# Patient Record
Sex: MALE | Race: ASIAN | NOT HISPANIC OR LATINO | Employment: OTHER | URBAN - METROPOLITAN AREA
[De-identification: names, ages, dates, MRNs, and addresses within clinical notes are randomized per-mention and may not be internally consistent; named-entity substitution may affect disease eponyms.]

---

## 2023-03-21 DIAGNOSIS — I10 HYPERTENSION, UNSPECIFIED TYPE: Primary | ICD-10-CM

## 2023-03-21 DIAGNOSIS — E78.2 MIXED HYPERLIPIDEMIA: Primary | ICD-10-CM

## 2023-03-22 RX ORDER — LOSARTAN POTASSIUM 100 MG/1
TABLET ORAL
Qty: 90 TABLET | Refills: 0 | Status: SHIPPED | OUTPATIENT
Start: 2023-03-22 | End: 2023-07-18 | Stop reason: SDUPTHER

## 2023-03-22 RX ORDER — EZETIMIBE 10 MG/1
TABLET ORAL
Qty: 30 TABLET | Refills: 0 | Status: SHIPPED | OUTPATIENT
Start: 2023-03-22 | End: 2023-12-12 | Stop reason: HOSPADM

## 2023-03-23 DIAGNOSIS — E78.2 MIXED HYPERLIPIDEMIA: Primary | ICD-10-CM

## 2023-03-23 RX ORDER — SIMVASTATIN 40 MG/1
TABLET, FILM COATED ORAL
Qty: 30 TABLET | Refills: 0 | Status: SHIPPED | OUTPATIENT
Start: 2023-03-23 | End: 2023-04-28

## 2023-04-27 DIAGNOSIS — E78.2 MIXED HYPERLIPIDEMIA: ICD-10-CM

## 2023-04-28 RX ORDER — SIMVASTATIN 40 MG/1
TABLET, FILM COATED ORAL
Qty: 30 TABLET | Refills: 0 | Status: SHIPPED | OUTPATIENT
Start: 2023-04-28 | End: 2023-05-22

## 2023-05-19 DIAGNOSIS — E78.2 MIXED HYPERLIPIDEMIA: ICD-10-CM

## 2023-05-22 RX ORDER — SIMVASTATIN 40 MG/1
TABLET, FILM COATED ORAL
Qty: 30 TABLET | Refills: 2 | Status: SHIPPED | OUTPATIENT
Start: 2023-05-22 | End: 2023-09-01

## 2023-05-23 DIAGNOSIS — K21.9 GASTROESOPHAGEAL REFLUX DISEASE WITHOUT ESOPHAGITIS: Primary | ICD-10-CM

## 2023-05-23 RX ORDER — PANTOPRAZOLE SODIUM 40 MG/1
TABLET, DELAYED RELEASE ORAL
Qty: 30 TABLET | Refills: 0 | Status: SHIPPED | OUTPATIENT
Start: 2023-05-23 | End: 2023-07-12 | Stop reason: SDUPTHER

## 2023-07-12 ENCOUNTER — OFFICE VISIT (OUTPATIENT)
Dept: PRIMARY CARE | Facility: CLINIC | Age: 88
End: 2023-07-12
Payer: COMMERCIAL

## 2023-07-12 VITALS
SYSTOLIC BLOOD PRESSURE: 116 MMHG | WEIGHT: 158 LBS | HEART RATE: 86 BPM | TEMPERATURE: 98.2 F | DIASTOLIC BLOOD PRESSURE: 76 MMHG | RESPIRATION RATE: 16 BRPM | OXYGEN SATURATION: 100 % | BODY MASS INDEX: 26.29 KG/M2

## 2023-07-12 DIAGNOSIS — M25.521 ELBOW PAIN, CHRONIC, RIGHT: Primary | ICD-10-CM

## 2023-07-12 DIAGNOSIS — N18.31 STAGE 3A CHRONIC KIDNEY DISEASE (MULTI): ICD-10-CM

## 2023-07-12 DIAGNOSIS — K21.9 GASTROESOPHAGEAL REFLUX DISEASE WITHOUT ESOPHAGITIS: ICD-10-CM

## 2023-07-12 DIAGNOSIS — G89.29 ELBOW PAIN, CHRONIC, RIGHT: Primary | ICD-10-CM

## 2023-07-12 DIAGNOSIS — I48.20 CHRONIC A-FIB (MULTI): ICD-10-CM

## 2023-07-12 PROCEDURE — 99213 OFFICE O/P EST LOW 20 MIN: CPT | Performed by: FAMILY MEDICINE

## 2023-07-12 PROCEDURE — 1126F AMNT PAIN NOTED NONE PRSNT: CPT | Performed by: FAMILY MEDICINE

## 2023-07-12 RX ORDER — FUROSEMIDE 20 MG/1
20 TABLET ORAL DAILY PRN
COMMUNITY
End: 2023-11-06

## 2023-07-12 RX ORDER — PANTOPRAZOLE SODIUM 40 MG/1
40 TABLET, DELAYED RELEASE ORAL DAILY
Qty: 30 TABLET | Refills: 2 | Status: SHIPPED | OUTPATIENT
Start: 2023-07-12 | End: 2023-10-05

## 2023-07-12 RX ORDER — AMLODIPINE BESYLATE 2.5 MG/1
2.5 TABLET ORAL DAILY
COMMUNITY
Start: 2023-06-10 | End: 2023-11-06

## 2023-07-12 RX ORDER — GABAPENTIN 300 MG/1
300 CAPSULE ORAL 3 TIMES DAILY
COMMUNITY
End: 2023-12-09 | Stop reason: ENTERED-IN-ERROR

## 2023-07-12 RX ORDER — RIVAROXABAN 15 MG/1
15 TABLET, FILM COATED ORAL DAILY
COMMUNITY
End: 2023-11-20

## 2023-07-12 ASSESSMENT — ENCOUNTER SYMPTOMS
RESPIRATORY NEGATIVE: 1
DEPRESSION: 1
JOINT SWELLING: 1
CARDIOVASCULAR NEGATIVE: 1
OCCASIONAL FEELINGS OF UNSTEADINESS: 1
APPETITE CHANGE: 1
LOSS OF SENSATION IN FEET: 0
AGITATION: 0
ARTHRALGIAS: 1

## 2023-07-12 NOTE — PROGRESS NOTES
Subjective   Patient ID: Aristeo Rome is a 89 y.o. male who presents for Med Refill.    Patient presented with chief complaint of right elbow pain.   this has been going on for some time now pain flexion and complete extension.  He had a history of this for some time.  Voltaren cream did not help.  Other medicines did not help.  He has no numbness tingling.  Feels weaker at times.  Patient has no chest pain or shortness of breath no nausea vomiting diarrhea.  His appetite is a little decreased.  He has been taking Ensure that helps.  He needs a refill  Patient also needs a refill on his PPI.         Review of Systems   Constitutional:  Positive for appetite change.   Respiratory: Negative.     Cardiovascular: Negative.    Musculoskeletal:  Positive for arthralgias and joint swelling.   Psychiatric/Behavioral:  Negative for agitation.        Objective   /76 (BP Location: Right arm, Patient Position: Sitting, BP Cuff Size: Adult)   Pulse 86   Temp 36.8 °C (98.2 °F)   Resp 16   Wt 71.7 kg (158 lb)   SpO2 100%   BMI 26.29 kg/m²     Physical Exam  Constitutional:       Appearance: Normal appearance.   Cardiovascular:      Rate and Rhythm: Normal rate and regular rhythm.      Pulses: Normal pulses.   Pulmonary:      Effort: Pulmonary effort is normal.      Breath sounds: Normal breath sounds.   Musculoskeletal:      Comments: Patient's right elbow has limited range of motion.  Unable to flex the elbow completely.  Slight swelling tenderness.  Neurovascular intact, strength appears intact but limited due to pain   Neurological:      Mental Status: He is alert.         Assessment/Plan   Problem List Items Addressed This Visit       Chronic a-fib (CMS/HCC)    Relevant Medications    amLODIPine (Norvasc) 2.5 mg tablet    Stage 3a chronic kidney disease     Other Visit Diagnoses       Elbow pain, chronic, right    -  Primary    Gastroesophageal reflux disease without esophagitis        Relevant Medications     pantoprazole (ProtoNix) 40 mg EC tablet          Patient is a scheduled for physical in 3 months he can follow-up tomorrow for possible injection in his right elbow.  May need to see orthopedics  Have any chest pain shortness of breath any nausea vomiting or fever headache Or ER  Patient  Daughter translate for him.  Declined

## 2023-07-13 ENCOUNTER — OFFICE VISIT (OUTPATIENT)
Dept: PRIMARY CARE | Facility: CLINIC | Age: 88
End: 2023-07-13
Payer: COMMERCIAL

## 2023-07-13 VITALS
TEMPERATURE: 97.5 F | BODY MASS INDEX: 26.33 KG/M2 | DIASTOLIC BLOOD PRESSURE: 58 MMHG | RESPIRATION RATE: 16 BRPM | HEART RATE: 75 BPM | OXYGEN SATURATION: 96 % | WEIGHT: 158.25 LBS | SYSTOLIC BLOOD PRESSURE: 94 MMHG

## 2023-07-13 DIAGNOSIS — M25.521 ELBOW PAIN, CHRONIC, RIGHT: Primary | ICD-10-CM

## 2023-07-13 DIAGNOSIS — G89.29 ELBOW PAIN, CHRONIC, RIGHT: Primary | ICD-10-CM

## 2023-07-13 PROCEDURE — 1126F AMNT PAIN NOTED NONE PRSNT: CPT | Performed by: STUDENT IN AN ORGANIZED HEALTH CARE EDUCATION/TRAINING PROGRAM

## 2023-07-13 PROCEDURE — 76942 ECHO GUIDE FOR BIOPSY: CPT

## 2023-07-13 PROCEDURE — 20551 NJX 1 TENDON ORIGIN/INSJ: CPT

## 2023-07-13 PROCEDURE — 1159F MED LIST DOCD IN RCRD: CPT | Performed by: STUDENT IN AN ORGANIZED HEALTH CARE EDUCATION/TRAINING PROGRAM

## 2023-07-13 PROCEDURE — 99213 OFFICE O/P EST LOW 20 MIN: CPT | Performed by: STUDENT IN AN ORGANIZED HEALTH CARE EDUCATION/TRAINING PROGRAM

## 2023-07-13 PROCEDURE — 1160F RVW MEDS BY RX/DR IN RCRD: CPT | Performed by: STUDENT IN AN ORGANIZED HEALTH CARE EDUCATION/TRAINING PROGRAM

## 2023-07-13 PROCEDURE — 1036F TOBACCO NON-USER: CPT | Performed by: STUDENT IN AN ORGANIZED HEALTH CARE EDUCATION/TRAINING PROGRAM

## 2023-07-13 PROCEDURE — 96372 THER/PROPH/DIAG INJ SC/IM: CPT | Performed by: STUDENT IN AN ORGANIZED HEALTH CARE EDUCATION/TRAINING PROGRAM

## 2023-07-13 RX ORDER — LIDOCAINE HYDROCHLORIDE 10 MG/ML
0.5 INJECTION INFILTRATION; PERINEURAL
Status: COMPLETED | OUTPATIENT
Start: 2023-07-13 | End: 2023-07-13

## 2023-07-13 RX ORDER — TRIAMCINOLONE ACETONIDE 40 MG/ML
20 INJECTION, SUSPENSION INTRA-ARTICULAR; INTRAMUSCULAR
Status: COMPLETED | OUTPATIENT
Start: 2023-07-13 | End: 2023-07-13

## 2023-07-13 RX ADMIN — TRIAMCINOLONE ACETONIDE 20 MG: 40 INJECTION, SUSPENSION INTRA-ARTICULAR; INTRAMUSCULAR at 15:09

## 2023-07-13 RX ADMIN — LIDOCAINE HYDROCHLORIDE 0.5 ML: 10 INJECTION INFILTRATION; PERINEURAL at 15:09

## 2023-07-13 ASSESSMENT — ENCOUNTER SYMPTOMS: ARTHRALGIAS: 1

## 2023-07-13 NOTE — PROGRESS NOTES
Subjective   Aristeo Rome is a 89 y.o. male who presents for Arm Pain (Pt here today for steroid injection to right arm).  HPI  Aristeo is here with daughter for joint injection of the elbow.   He has had pain in right elbow for the past > 6 months. He is having shooting pain starting in right elbow and shooting down to right wrist.  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  Some weakness with picking up spoon.  No PT for the arm.  No EMG in the past.    No fever or chills.    Review of Systems   Musculoskeletal:  Positive for arthralgias.     Objective     BP 94/58 (BP Location: Right arm, Patient Position: Sitting)   Pulse 75   Temp 36.4 °C (97.5 °F) (Temporal)   Resp 16   Wt 71.8 kg (158 lb 4 oz)   SpO2 96%   BMI 26.33 kg/m²   Physical Exam  Musculoskeletal:         General: No swelling or deformity.      Comments: RUE: Most TTP to lateral epicondyle, lesser so to medial epicondyle and bicep insertion. Pain  moreso with extension of middle finger with resistance, lesser so with flexion. Pain with resistance in arm flexion. Strength mildly diminished distally. NV intact.        Injection Upper Extremity: R elbow for lateral epicondylitis on 7/13/2023 3:09 PM  Indications: pain  Details: 25 G needle, ultrasound-guided lateral approach  Medications: 0.5 mL lidocaine 10 mg/mL (1 %); 20 mg triamcinolone acetonide 40 mg/mL  Outcome: tolerated well, no immediate complications  Consent was given by the patient. Patient was prepped and draped in the usual sterile fashion.        Assessment/Plan   Problem List Items Addressed This Visit    None  Visit Diagnoses       Elbow pain, chronic, right    -  Primary    Relevant Orders    Injection Upper Extremity: R elbow            Patient seen today for corticosteroid injection, patient has diffuse pain over the elbow but with actions, we did did a lateral epicondyle injection as above.  Patient tolerated well without complication.    Continue follow-up  with PCP if symptoms persist or worsen.

## 2023-07-14 NOTE — RESULT ENCOUNTER NOTE
Can we let the patient and his daughter know his x-ray showed advanced arthritic changes in his elbow with some swelling

## 2023-07-18 DIAGNOSIS — I10 HYPERTENSION, UNSPECIFIED TYPE: ICD-10-CM

## 2023-07-18 RX ORDER — LOSARTAN POTASSIUM 100 MG/1
100 TABLET ORAL DAILY
Qty: 90 TABLET | Refills: 1 | Status: SHIPPED | OUTPATIENT
Start: 2023-07-18 | End: 2024-01-15

## 2023-07-19 ENCOUNTER — TELEPHONE (OUTPATIENT)
Dept: PRIMARY CARE | Facility: CLINIC | Age: 88
End: 2023-07-19
Payer: COMMERCIAL

## 2023-07-31 ENCOUNTER — TELEPHONE (OUTPATIENT)
Dept: PRIMARY CARE | Facility: CLINIC | Age: 88
End: 2023-07-31
Payer: COMMERCIAL

## 2023-07-31 DIAGNOSIS — N18.31 STAGE 3A CHRONIC KIDNEY DISEASE (MULTI): Primary | ICD-10-CM

## 2023-08-01 RX ORDER — PEDI NUTRITION,IRON,LACT-FREE 0.06 G-1.5
414 LIQUID (ML) ORAL DAILY
Qty: 5796 ML | Refills: 11 | Status: SHIPPED | OUTPATIENT
Start: 2023-08-01 | End: 2024-03-25

## 2023-09-01 DIAGNOSIS — E78.2 MIXED HYPERLIPIDEMIA: ICD-10-CM

## 2023-09-01 RX ORDER — SIMVASTATIN 40 MG/1
40 TABLET, FILM COATED ORAL DAILY
Qty: 30 TABLET | Refills: 0 | Status: SHIPPED | OUTPATIENT
Start: 2023-09-01 | End: 2023-10-06

## 2023-10-05 DIAGNOSIS — K21.9 GASTROESOPHAGEAL REFLUX DISEASE WITHOUT ESOPHAGITIS: ICD-10-CM

## 2023-10-05 RX ORDER — PANTOPRAZOLE SODIUM 40 MG/1
40 TABLET, DELAYED RELEASE ORAL DAILY
Qty: 30 TABLET | Refills: 0 | Status: SHIPPED | OUTPATIENT
Start: 2023-10-05 | End: 2023-11-13

## 2023-10-06 DIAGNOSIS — E78.2 MIXED HYPERLIPIDEMIA: ICD-10-CM

## 2023-10-06 RX ORDER — SIMVASTATIN 40 MG/1
40 TABLET, FILM COATED ORAL DAILY
Qty: 30 TABLET | Refills: 0 | Status: SHIPPED | OUTPATIENT
Start: 2023-10-06 | End: 2023-11-13

## 2023-11-06 DIAGNOSIS — I10 HYPERTENSION, UNSPECIFIED TYPE: Primary | ICD-10-CM

## 2023-11-06 DIAGNOSIS — I48.20 CHRONIC A-FIB (MULTI): ICD-10-CM

## 2023-11-06 RX ORDER — FUROSEMIDE 20 MG/1
20 TABLET ORAL DAILY PRN
Qty: 30 TABLET | Refills: 11 | Status: SHIPPED | OUTPATIENT
Start: 2023-11-06

## 2023-11-06 RX ORDER — AMLODIPINE BESYLATE 2.5 MG/1
2.5 TABLET ORAL DAILY
Qty: 30 TABLET | Refills: 11 | Status: SHIPPED | OUTPATIENT
Start: 2023-11-06

## 2023-11-10 DIAGNOSIS — E78.2 MIXED HYPERLIPIDEMIA: ICD-10-CM

## 2023-11-10 DIAGNOSIS — K21.9 GASTROESOPHAGEAL REFLUX DISEASE WITHOUT ESOPHAGITIS: ICD-10-CM

## 2023-11-13 ENCOUNTER — OFFICE VISIT (OUTPATIENT)
Dept: PRIMARY CARE | Facility: CLINIC | Age: 88
End: 2023-11-13
Payer: COMMERCIAL

## 2023-11-13 VITALS
TEMPERATURE: 98 F | WEIGHT: 160.38 LBS | SYSTOLIC BLOOD PRESSURE: 135 MMHG | BODY MASS INDEX: 26.69 KG/M2 | OXYGEN SATURATION: 98 % | RESPIRATION RATE: 16 BRPM | DIASTOLIC BLOOD PRESSURE: 70 MMHG | HEART RATE: 77 BPM

## 2023-11-13 DIAGNOSIS — E78.5 DYSLIPIDEMIA: ICD-10-CM

## 2023-11-13 DIAGNOSIS — Z23 NEED FOR VACCINATION: ICD-10-CM

## 2023-11-13 DIAGNOSIS — M25.521 ELBOW PAIN, CHRONIC, RIGHT: Primary | ICD-10-CM

## 2023-11-13 DIAGNOSIS — I48.20 CHRONIC A-FIB (MULTI): ICD-10-CM

## 2023-11-13 DIAGNOSIS — G89.29 ELBOW PAIN, CHRONIC, RIGHT: Primary | ICD-10-CM

## 2023-11-13 DIAGNOSIS — R60.0 BILATERAL EDEMA OF LOWER EXTREMITY: ICD-10-CM

## 2023-11-13 DIAGNOSIS — G62.9 NEUROPATHY: ICD-10-CM

## 2023-11-13 DIAGNOSIS — N18.31 STAGE 3A CHRONIC KIDNEY DISEASE (MULTI): ICD-10-CM

## 2023-11-13 DIAGNOSIS — Z00.00 MEDICARE ANNUAL WELLNESS VISIT, SUBSEQUENT: ICD-10-CM

## 2023-11-13 DIAGNOSIS — I10 HYPERTENSION, UNSPECIFIED TYPE: ICD-10-CM

## 2023-11-13 PROBLEM — N10 PYELONEPHRITIS, ACUTE: Status: RESOLVED | Noted: 2023-11-13 | Resolved: 2023-11-13

## 2023-11-13 PROBLEM — I25.10 CAD (CORONARY ARTERY DISEASE): Status: ACTIVE | Noted: 2023-11-13

## 2023-11-13 PROBLEM — N13.8 BPH WITH OBSTRUCTION/LOWER URINARY TRACT SYMPTOMS: Status: ACTIVE | Noted: 2023-11-13

## 2023-11-13 PROBLEM — N40.1 BPH WITH OBSTRUCTION/LOWER URINARY TRACT SYMPTOMS: Status: ACTIVE | Noted: 2023-11-13

## 2023-11-13 PROBLEM — K21.9 GASTROESOPHAGEAL REFLUX DISEASE: Status: ACTIVE | Noted: 2023-11-13

## 2023-11-13 PROBLEM — J20.9 ACUTE BRONCHITIS: Status: RESOLVED | Noted: 2023-11-13 | Resolved: 2023-11-13

## 2023-11-13 PROBLEM — R79.89 ELEVATED LFTS: Status: ACTIVE | Noted: 2023-11-13

## 2023-11-13 PROBLEM — L30.9 DERMATITIS: Status: RESOLVED | Noted: 2023-11-13 | Resolved: 2023-11-13

## 2023-11-13 PROBLEM — L21.9 SEBORRHEIC DERMATITIS: Status: RESOLVED | Noted: 2023-11-13 | Resolved: 2023-11-13

## 2023-11-13 PROBLEM — J30.2 ALLERGIC RHINITIS, SEASONAL: Status: ACTIVE | Noted: 2023-11-13

## 2023-11-13 PROBLEM — R05.9 COUGH: Status: RESOLVED | Noted: 2023-11-13 | Resolved: 2023-11-13

## 2023-11-13 PROCEDURE — G0008 ADMIN INFLUENZA VIRUS VAC: HCPCS

## 2023-11-13 PROCEDURE — G0439 PPPS, SUBSEQ VISIT: HCPCS

## 2023-11-13 PROCEDURE — 90662 IIV NO PRSV INCREASED AG IM: CPT

## 2023-11-13 PROCEDURE — 1126F AMNT PAIN NOTED NONE PRSNT: CPT

## 2023-11-13 PROCEDURE — 1036F TOBACCO NON-USER: CPT

## 2023-11-13 PROCEDURE — 1160F RVW MEDS BY RX/DR IN RCRD: CPT

## 2023-11-13 PROCEDURE — 1159F MED LIST DOCD IN RCRD: CPT

## 2023-11-13 PROCEDURE — 3078F DIAST BP <80 MM HG: CPT

## 2023-11-13 PROCEDURE — 3075F SYST BP GE 130 - 139MM HG: CPT

## 2023-11-13 RX ORDER — SIMVASTATIN 40 MG/1
40 TABLET, FILM COATED ORAL DAILY
Qty: 30 TABLET | Refills: 0 | Status: SHIPPED | OUTPATIENT
Start: 2023-11-13 | End: 2024-01-08

## 2023-11-13 RX ORDER — LIDOCAINE 50 MG/G
1 PATCH TOPICAL DAILY
Qty: 30 PATCH | Refills: 11 | Status: SHIPPED | OUTPATIENT
Start: 2023-11-13 | End: 2023-12-12 | Stop reason: HOSPADM

## 2023-11-13 RX ORDER — PANTOPRAZOLE SODIUM 40 MG/1
40 TABLET, DELAYED RELEASE ORAL DAILY
Qty: 30 TABLET | Refills: 0 | Status: SHIPPED | OUTPATIENT
Start: 2023-11-13 | End: 2024-02-07

## 2023-11-13 RX ORDER — DICLOFENAC SODIUM 10 MG/G
4 GEL TOPICAL 4 TIMES DAILY
Qty: 480 G | Refills: 1 | Status: SHIPPED | OUTPATIENT
Start: 2023-11-13 | End: 2023-12-12 | Stop reason: HOSPADM

## 2023-11-13 ASSESSMENT — ENCOUNTER SYMPTOMS
DEPRESSION: 1
SHORTNESS OF BREATH: 0
WEAKNESS: 0
COLOR CHANGE: 0
NECK PAIN: 0
CHILLS: 0
OCCASIONAL FEELINGS OF UNSTEADINESS: 1
BACK PAIN: 0
ARTHRALGIAS: 1
JOINT SWELLING: 1
NUMBNESS: 0
NECK STIFFNESS: 0
WOUND: 0
FATIGUE: 0
FEVER: 0
LOSS OF SENSATION IN FEET: 1

## 2023-11-13 ASSESSMENT — COLUMBIA-SUICIDE SEVERITY RATING SCALE - C-SSRS
6. HAVE YOU EVER DONE ANYTHING, STARTED TO DO ANYTHING, OR PREPARED TO DO ANYTHING TO END YOUR LIFE?: NO
1. IN THE PAST MONTH, HAVE YOU WISHED YOU WERE DEAD OR WISHED YOU COULD GO TO SLEEP AND NOT WAKE UP?: NO
2. HAVE YOU ACTUALLY HAD ANY THOUGHTS OF KILLING YOURSELF?: NO

## 2023-11-13 ASSESSMENT — PATIENT HEALTH QUESTIONNAIRE - PHQ9
1. LITTLE INTEREST OR PLEASURE IN DOING THINGS: SEVERAL DAYS
10. IF YOU CHECKED OFF ANY PROBLEMS, HOW DIFFICULT HAVE THESE PROBLEMS MADE IT FOR YOU TO DO YOUR WORK, TAKE CARE OF THINGS AT HOME, OR GET ALONG WITH OTHER PEOPLE: VERY DIFFICULT
SUM OF ALL RESPONSES TO PHQ9 QUESTIONS 1 AND 2: 2
2. FEELING DOWN, DEPRESSED OR HOPELESS: SEVERAL DAYS

## 2023-11-13 NOTE — PROGRESS NOTES
Subjective   Patient ID: Aristeo Rome is a 89 y.o. male who presents for Arthritis and Durable Medical Equipment (Pt here today to F/U on arthritis and to request a Rx for walk in shower euipment and flu vaccine).  Patient presents with Daughter to translate. Offered , deferred to daughter translating. Right elbow pain continues to be bad and wanting to know if can change pain medication. Is taking gabapentin 300mg tid. Injection helps greatly but it wears off. Has been worse with temperature change lately. Has used Lidoderm with relief previously. Occasional NSAID use. Also coming requesting prescription for walk in shower. Has neuropathy and trouble getting into and out of shower. Daughter helps with home health. Patient also requesting flu shot today as well. No other acute concerns or complaints today. No concerns with diet or activity. Some difficulty with showering due to getting into tub/shower. Has remaining help form daughter. No issues with Edema, HTN, Dyslipidemia, taking medications as prescribed without issue    Arthritis  He complains of joint swelling. Pertinent negatives include no fatigue, fever or rash.   Durable Medical Equipment  This is a chronic problem. The problem occurs constantly. Associated symptoms include arthralgias and joint swelling. Pertinent negatives include no chest pain, chills, fatigue, fever, neck pain, numbness, rash or weakness.       Review of Systems   Constitutional:  Negative for chills, fatigue and fever.   Respiratory:  Negative for shortness of breath.    Cardiovascular:  Negative for chest pain and leg swelling.   Musculoskeletal:  Positive for arthralgias, arthritis and joint swelling. Negative for back pain, neck pain and neck stiffness.   Skin:  Negative for color change, rash and wound.   Neurological:  Negative for weakness and numbness.       Objective   Physical Exam  Constitutional:       Appearance: Normal appearance.   HENT:      Head: Normocephalic  and atraumatic.      Nose: Nose normal.      Mouth/Throat:      Mouth: Mucous membranes are moist.      Pharynx: Oropharynx is clear.   Eyes:      Conjunctiva/sclera: Conjunctivae normal.   Cardiovascular:      Rate and Rhythm: Normal rate and regular rhythm.      Pulses: Normal pulses.      Heart sounds: Normal heart sounds.   Pulmonary:      Effort: Pulmonary effort is normal.      Breath sounds: Normal breath sounds.   Musculoskeletal:      Left elbow: Swelling present. No lacerations. Tenderness present in lateral epicondyle.   Skin:     General: Skin is warm.      Capillary Refill: Capillary refill takes less than 2 seconds.   Neurological:      Mental Status: He is alert.   Psychiatric:         Mood and Affect: Mood normal.         Behavior: Behavior normal.         Assessment/Plan   Problem List Items Addressed This Visit             ICD-10-CM    Chronic a-fib (CMS/[facility]) I48.20    Stage 3a chronic kidney disease (CMS/[facility]) N18.31    Relevant Orders    CBC    Bilateral edema of lower extremity R60.0    Dyslipidemia E78.5    Hypertension I10    Neuropathy G62.9    Relevant Orders    CBC     Other Visit Diagnoses         Codes    Elbow pain, chronic, right    -  Primary M25.521, G89.29    Relevant Medications    lidocaine (Lidoderm) 5 % patch    diclofenac sodium (Voltaren) 1 % gel gel    Other Relevant Orders    Referral to Orthopaedic Surgery    Need for vaccination     Z23    Relevant Orders    Flu vaccine, quadrivalent, high-dose, preservative free, age 65y+ (FLUZONE) (Completed)    Medicare annual wellness visit, subsequent     Z00.00    Relevant Orders    CBC    Comprehensive metabolic panel    Lipid panel         Patient here for medicare annual wellness as well as several problems today. Anticipatory guidance and discussed social aspects of health care. Some issues with ADLs like bathing due to arthritis and neuropathy. Patient would benefit from walk in shower. Prescription given  No changes in  medications for above chronic conditions  For elbow, will refer to orthopedics. Has advanced arthritis and has already received several joint injections. Has used intermittent NSAIDs, but would like to avoid excessive NSAID use due to CKD. Will start diclofenac gel and Lidoderm.   Flu shot given today  Routine lab work for medicare wellness  Follow up as needed

## 2023-11-14 NOTE — PROGRESS NOTES
I saw and evaluated the patient. I personally obtained the key and critical portions of the history and physical exam or was physically present for key and critical portions performed by the resident/fellow. I reviewed the resident/fellow's documentation and discussed the patient with the resident/fellow. I agree with the resident/fellow's medical decision making as documented in the note.  Patient still having a lot of elbow pain.  The shot did improve it but after a few months it wore off.  Still with limited range of motion.  Patient still has some neuropathy in his feet.  This is the pain in his feet and legs.  Still trouble with ambulation.  Patient's wife has been helping them assist him with the shower.  He is having trouble getting in and out of the shower.  So he is actually decreasing his hygiene because of this.  Patient has a longstanding history of neuropathy foot pain.  Back pain.  Patient would benefit from having a walk-in shower.  He has done physical therapy in the past.  We still want to make sure he does not fall.  May need to continue  physical therapy  If any chest pain shortness of breath any nausea vomiting diarrhea fever headache any concerning symptoms go to the ER  Follow-up in 1 month  Agree with assessment plan  Manuel Pryor, DO

## 2023-11-20 DIAGNOSIS — I48.20 CHRONIC A-FIB (MULTI): Primary | ICD-10-CM

## 2023-11-20 RX ORDER — RIVAROXABAN 15 MG/1
15 TABLET, FILM COATED ORAL DAILY
Qty: 90 TABLET | Refills: 3 | Status: SHIPPED | OUTPATIENT
Start: 2023-11-20

## 2023-12-04 ENCOUNTER — OFFICE VISIT (OUTPATIENT)
Dept: ORTHOPEDIC SURGERY | Facility: CLINIC | Age: 88
End: 2023-12-04
Payer: COMMERCIAL

## 2023-12-04 DIAGNOSIS — M19.029 ELBOW ARTHRITIS: ICD-10-CM

## 2023-12-04 DIAGNOSIS — G89.29 ELBOW PAIN, CHRONIC, RIGHT: ICD-10-CM

## 2023-12-04 DIAGNOSIS — M25.521 ELBOW PAIN, CHRONIC, RIGHT: ICD-10-CM

## 2023-12-04 PROCEDURE — 20600 DRAIN/INJ JOINT/BURSA W/O US: CPT | Performed by: ORTHOPAEDIC SURGERY

## 2023-12-04 PROCEDURE — 1160F RVW MEDS BY RX/DR IN RCRD: CPT | Performed by: ORTHOPAEDIC SURGERY

## 2023-12-04 PROCEDURE — 2500000005 HC RX 250 GENERAL PHARMACY W/O HCPCS: Performed by: ORTHOPAEDIC SURGERY

## 2023-12-04 PROCEDURE — 2500000004 HC RX 250 GENERAL PHARMACY W/ HCPCS (ALT 636 FOR OP/ED): Performed by: ORTHOPAEDIC SURGERY

## 2023-12-04 PROCEDURE — 1126F AMNT PAIN NOTED NONE PRSNT: CPT | Performed by: ORTHOPAEDIC SURGERY

## 2023-12-04 PROCEDURE — 1159F MED LIST DOCD IN RCRD: CPT | Performed by: ORTHOPAEDIC SURGERY

## 2023-12-04 PROCEDURE — 1036F TOBACCO NON-USER: CPT | Performed by: ORTHOPAEDIC SURGERY

## 2023-12-04 PROCEDURE — 99204 OFFICE O/P NEW MOD 45 MIN: CPT | Performed by: ORTHOPAEDIC SURGERY

## 2023-12-04 PROCEDURE — 99214 OFFICE O/P EST MOD 30 MIN: CPT | Performed by: ORTHOPAEDIC SURGERY

## 2023-12-04 RX ORDER — LIDOCAINE HYDROCHLORIDE 10 MG/ML
1 INJECTION INFILTRATION; PERINEURAL ONCE
Status: COMPLETED | OUTPATIENT
Start: 2023-12-04 | End: 2023-12-04

## 2023-12-04 RX ORDER — TRIAMCINOLONE ACETONIDE 40 MG/ML
40 INJECTION, SUSPENSION INTRA-ARTICULAR; INTRAMUSCULAR ONCE
Status: COMPLETED | OUTPATIENT
Start: 2023-12-04 | End: 2023-12-04

## 2023-12-04 RX ADMIN — TRIAMCINOLONE ACETONIDE 40 MG: 40 INJECTION, SUSPENSION INTRA-ARTICULAR; INTRAMUSCULAR at 15:09

## 2023-12-04 RX ADMIN — LIDOCAINE HYDROCHLORIDE 1 ML: 10 INJECTION, SOLUTION INFILTRATION; PERINEURAL at 15:08

## 2023-12-04 NOTE — PROGRESS NOTES
History present illness: Patient presents today with his daughter who acts as his .  He does not speak English.  Native speech is [place]ian.  The patient describes chronic right elbow pain.  The patient is right-hand dominant.  He has history of hyperlipidemia and atrial fibrillation for which he takes Xarelto and aspirin.  He has had previous steroid injection and sounds as though it was delivered by the PCP.  The injection helped.  That was back in the summer.  Now pain has returned.      Past medical history: The patient's past medical history, family history, social history, and review of systems were documented on the patient medical intake.  The updated data was reviewed in the electronic medical record.  History is negative except otherwise stated in history of present illness.        Physical examination:  General: Alert and oriented to person, place, and time.  No acute distress and breathing comfortably: Pleasant and cooperative with examination.  HEENT: Head is normocephalic and atraumatic.  Neck: Supple, no visible swelling.  Cardiovascular: No palpable tachycardia  Lungs: No audible wheezing or labored breathing  Abdomen: Nondistended.  Extremities: Evaluation of the right upper extremity finds the patient had palpable radial artery at the wrist with brisk capillary refill to all digits.  Patient has intact sensation to axillary radial median and ulnar nerves.  There are no open wounds.  There are no signs of infection.  There is no evidence of lymphedema or lymphatic streaking.  The patient has supple compartments to right arm forearm and hand.  Tenderness and swelling about the right elbow.  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  We talked  about operative and nonoperative strategies.  Patient elects for steroid injection to the right elbow and for a 6-week follow-up.  If he is feeling good at that time he can call and cancel understanding that he can reschedule when pain does return.        Procedure:  It was explained to the patient that the risks of a steroid injection include but are not limited to infection, local skin irritation, skin atrophy, calcification, continued pain and discomfort, elevated blood sugar, burning, failure to relieve pain, and possible late infection. The patient verbalized good insight and verbalized consent for the injection. It was further explained that the post-injection discomfort can be alleviated with additional medications, ice, elevation, and rest over the first 24 hours, and that these modalities are recommended.  After informed consent was provided, patient identification was confirmed, and allergies were verified, the patient was appropriately positioned. The site was marked and time-out performed.      Using aseptic technique a 20-gauge needle was used to inject 40 mg Kenalog and 4 cc 1% lidocaine plain intra-articularly into the right elbow using the lateral soft spot.  A Band-Aid was placed.  The patient tolerated this well.

## 2023-12-09 ENCOUNTER — HOSPITAL ENCOUNTER (OUTPATIENT)
Facility: HOSPITAL | Age: 88
Setting detail: OBSERVATION
Discharge: HOME | End: 2023-12-12
Attending: EMERGENCY MEDICINE | Admitting: STUDENT IN AN ORGANIZED HEALTH CARE EDUCATION/TRAINING PROGRAM
Payer: COMMERCIAL

## 2023-12-09 ENCOUNTER — APPOINTMENT (OUTPATIENT)
Dept: RADIOLOGY | Facility: HOSPITAL | Age: 88
End: 2023-12-09
Payer: COMMERCIAL

## 2023-12-09 DIAGNOSIS — R07.89 OTHER CHEST PAIN: ICD-10-CM

## 2023-12-09 DIAGNOSIS — K59.00 CONSTIPATION, UNSPECIFIED CONSTIPATION TYPE: ICD-10-CM

## 2023-12-09 DIAGNOSIS — I24.9 ACUTE ISCHEMIC HEART DISEASE, UNSPECIFIED (MULTI): ICD-10-CM

## 2023-12-09 DIAGNOSIS — I10 HYPERTENSION, UNSPECIFIED TYPE: ICD-10-CM

## 2023-12-09 DIAGNOSIS — I25.9 CHEST PAIN DUE TO MYOCARDIAL ISCHEMIA, UNSPECIFIED ISCHEMIC CHEST PAIN TYPE: ICD-10-CM

## 2023-12-09 DIAGNOSIS — I24.89 OTHER FORMS OF ACUTE ISCHEMIC HEART DISEASE (MULTI): ICD-10-CM

## 2023-12-09 DIAGNOSIS — R07.9 CHEST PAIN, UNSPECIFIED TYPE: Primary | ICD-10-CM

## 2023-12-09 LAB
ALBUMIN SERPL BCP-MCNC: 3.8 G/DL (ref 3.4–5)
ALP SERPL-CCNC: 63 U/L (ref 33–136)
ALT SERPL W P-5'-P-CCNC: 23 U/L (ref 10–52)
ANION GAP SERPL CALC-SCNC: 14 MMOL/L (ref 10–20)
AST SERPL W P-5'-P-CCNC: 18 U/L (ref 9–39)
BASOPHILS # BLD AUTO: 0.01 X10*3/UL (ref 0–0.1)
BASOPHILS NFR BLD AUTO: 0.1 %
BILIRUB SERPL-MCNC: 0.5 MG/DL (ref 0–1.2)
BUN SERPL-MCNC: 56 MG/DL (ref 6–23)
CALCIUM SERPL-MCNC: 8.8 MG/DL (ref 8.6–10.3)
CARDIAC TROPONIN I PNL SERPL HS: 10 NG/L (ref 0–20)
CARDIAC TROPONIN I PNL SERPL HS: 8 NG/L (ref 0–20)
CHLORIDE SERPL-SCNC: 97 MMOL/L (ref 98–107)
CO2 SERPL-SCNC: 28 MMOL/L (ref 21–32)
CREAT SERPL-MCNC: 2.7 MG/DL (ref 0.5–1.3)
EOSINOPHIL # BLD AUTO: 0.01 X10*3/UL (ref 0–0.4)
EOSINOPHIL NFR BLD AUTO: 0.1 %
ERYTHROCYTE [DISTWIDTH] IN BLOOD BY AUTOMATED COUNT: 12.6 % (ref 11.5–14.5)
GFR SERPL CREATININE-BSD FRML MDRD: 22 ML/MIN/1.73M*2
GLUCOSE SERPL-MCNC: 134 MG/DL (ref 74–99)
HCT VFR BLD AUTO: 41 % (ref 41–52)
HGB BLD-MCNC: 13.5 G/DL (ref 13.5–17.5)
HOLD SPECIMEN: NORMAL
IMM GRANULOCYTES # BLD AUTO: 0.03 X10*3/UL (ref 0–0.5)
IMM GRANULOCYTES NFR BLD AUTO: 0.4 % (ref 0–0.9)
LYMPHOCYTES # BLD AUTO: 1.71 X10*3/UL (ref 0.8–3)
LYMPHOCYTES NFR BLD AUTO: 23 %
MCH RBC QN AUTO: 29.6 PG (ref 26–34)
MCHC RBC AUTO-ENTMCNC: 32.9 G/DL (ref 32–36)
MCV RBC AUTO: 90 FL (ref 80–100)
MONOCYTES # BLD AUTO: 0.79 X10*3/UL (ref 0.05–0.8)
MONOCYTES NFR BLD AUTO: 10.6 %
NEUTROPHILS # BLD AUTO: 4.89 X10*3/UL (ref 1.6–5.5)
NEUTROPHILS NFR BLD AUTO: 65.8 %
NRBC BLD-RTO: 0 /100 WBCS (ref 0–0)
PLATELET # BLD AUTO: 222 X10*3/UL (ref 150–450)
POTASSIUM SERPL-SCNC: 3.5 MMOL/L (ref 3.5–5.3)
PROT SERPL-MCNC: 7.5 G/DL (ref 6.4–8.2)
RBC # BLD AUTO: 4.56 X10*6/UL (ref 4.5–5.9)
SODIUM SERPL-SCNC: 135 MMOL/L (ref 136–145)
WBC # BLD AUTO: 7.4 X10*3/UL (ref 4.4–11.3)

## 2023-12-09 PROCEDURE — 2500000001 HC RX 250 WO HCPCS SELF ADMINISTERED DRUGS (ALT 637 FOR MEDICARE OP): Performed by: EMERGENCY MEDICINE

## 2023-12-09 PROCEDURE — 93010 ELECTROCARDIOGRAM REPORT: CPT | Performed by: EMERGENCY MEDICINE

## 2023-12-09 PROCEDURE — 71046 X-RAY EXAM CHEST 2 VIEWS: CPT | Performed by: RADIOLOGY

## 2023-12-09 PROCEDURE — 84484 ASSAY OF TROPONIN QUANT: CPT

## 2023-12-09 PROCEDURE — 36415 COLL VENOUS BLD VENIPUNCTURE: CPT

## 2023-12-09 PROCEDURE — 2500000004 HC RX 250 GENERAL PHARMACY W/ HCPCS (ALT 636 FOR OP/ED): Performed by: STUDENT IN AN ORGANIZED HEALTH CARE EDUCATION/TRAINING PROGRAM

## 2023-12-09 PROCEDURE — 2500000002 HC RX 250 W HCPCS SELF ADMINISTERED DRUGS (ALT 637 FOR MEDICARE OP, ALT 636 FOR OP/ED): Performed by: STUDENT IN AN ORGANIZED HEALTH CARE EDUCATION/TRAINING PROGRAM

## 2023-12-09 PROCEDURE — 2500000005 HC RX 250 GENERAL PHARMACY W/O HCPCS: Performed by: INTERNAL MEDICINE

## 2023-12-09 PROCEDURE — 99223 1ST HOSP IP/OBS HIGH 75: CPT | Performed by: STUDENT IN AN ORGANIZED HEALTH CARE EDUCATION/TRAINING PROGRAM

## 2023-12-09 PROCEDURE — 80053 COMPREHEN METABOLIC PANEL: CPT

## 2023-12-09 PROCEDURE — G0378 HOSPITAL OBSERVATION PER HR: HCPCS

## 2023-12-09 PROCEDURE — 93010 ELECTROCARDIOGRAM REPORT: CPT | Performed by: INTERNAL MEDICINE

## 2023-12-09 PROCEDURE — 85025 COMPLETE CBC W/AUTO DIFF WBC: CPT

## 2023-12-09 PROCEDURE — 71046 X-RAY EXAM CHEST 2 VIEWS: CPT | Mod: FY

## 2023-12-09 PROCEDURE — 99285 EMERGENCY DEPT VISIT HI MDM: CPT | Performed by: EMERGENCY MEDICINE

## 2023-12-09 RX ORDER — BISACODYL 10 MG/1
10 SUPPOSITORY RECTAL DAILY PRN
Status: DISCONTINUED | OUTPATIENT
Start: 2023-12-09 | End: 2023-12-10

## 2023-12-09 RX ORDER — LIDOCAINE 560 MG/1
1 PATCH PERCUTANEOUS; TOPICAL; TRANSDERMAL DAILY
Status: DISCONTINUED | OUTPATIENT
Start: 2023-12-09 | End: 2023-12-12 | Stop reason: HOSPADM

## 2023-12-09 RX ORDER — ASPIRIN 81 MG/1
81 TABLET ORAL DAILY
Status: DISCONTINUED | OUTPATIENT
Start: 2023-12-09 | End: 2023-12-12 | Stop reason: HOSPADM

## 2023-12-09 RX ORDER — PANTOPRAZOLE SODIUM 40 MG/1
40 TABLET, DELAYED RELEASE ORAL DAILY
Status: DISCONTINUED | OUTPATIENT
Start: 2023-12-09 | End: 2023-12-12 | Stop reason: HOSPADM

## 2023-12-09 RX ORDER — AMOXICILLIN 250 MG
2 CAPSULE ORAL 2 TIMES DAILY
Status: DISCONTINUED | OUTPATIENT
Start: 2023-12-09 | End: 2023-12-12 | Stop reason: HOSPADM

## 2023-12-09 RX ORDER — SIMVASTATIN 40 MG/1
40 TABLET, FILM COATED ORAL NIGHTLY
Status: DISCONTINUED | OUTPATIENT
Start: 2023-12-09 | End: 2023-12-12 | Stop reason: HOSPADM

## 2023-12-09 RX ORDER — ASPIRIN 81 MG/1
81 TABLET ORAL DAILY
COMMUNITY

## 2023-12-09 RX ORDER — ACETAMINOPHEN 325 MG/1
650 TABLET ORAL EVERY 4 HOURS PRN
Status: DISCONTINUED | OUTPATIENT
Start: 2023-12-09 | End: 2023-12-12 | Stop reason: HOSPADM

## 2023-12-09 RX ORDER — NAPROXEN SODIUM 220 MG/1
162 TABLET, FILM COATED ORAL ONCE
Status: COMPLETED | OUTPATIENT
Start: 2023-12-09 | End: 2023-12-09

## 2023-12-09 RX ORDER — ACETAMINOPHEN 650 MG/1
650 SUPPOSITORY RECTAL EVERY 4 HOURS PRN
Status: DISCONTINUED | OUTPATIENT
Start: 2023-12-09 | End: 2023-12-12 | Stop reason: HOSPADM

## 2023-12-09 RX ORDER — POLYETHYLENE GLYCOL 3350 17 G/17G
17 POWDER, FOR SOLUTION ORAL DAILY
Status: DISCONTINUED | OUTPATIENT
Start: 2023-12-09 | End: 2023-12-12 | Stop reason: HOSPADM

## 2023-12-09 RX ORDER — EZETIMIBE 10 MG/1
10 TABLET ORAL NIGHTLY
Status: DISCONTINUED | OUTPATIENT
Start: 2023-12-09 | End: 2023-12-12 | Stop reason: HOSPADM

## 2023-12-09 RX ORDER — AMLODIPINE BESYLATE 2.5 MG/1
2.5 TABLET ORAL DAILY
Status: DISCONTINUED | OUTPATIENT
Start: 2023-12-09 | End: 2023-12-12 | Stop reason: HOSPADM

## 2023-12-09 RX ORDER — LOSARTAN POTASSIUM 100 MG/1
100 TABLET ORAL DAILY
Status: DISCONTINUED | OUTPATIENT
Start: 2023-12-09 | End: 2023-12-12 | Stop reason: HOSPADM

## 2023-12-09 RX ORDER — ACETAMINOPHEN 160 MG/5ML
650 SOLUTION ORAL EVERY 4 HOURS PRN
Status: DISCONTINUED | OUTPATIENT
Start: 2023-12-09 | End: 2023-12-12 | Stop reason: HOSPADM

## 2023-12-09 RX ADMIN — ASPIRIN 81 MG CHEWABLE TABLET 162 MG: 81 TABLET CHEWABLE at 12:36

## 2023-12-09 RX ADMIN — ACETAMINOPHEN 650 MG: 325 TABLET ORAL at 20:45

## 2023-12-09 RX ADMIN — LIDOCAINE 1 PATCH: 4 PATCH TOPICAL at 22:46

## 2023-12-09 RX ADMIN — SIMVASTATIN 40 MG: 40 TABLET, FILM COATED ORAL at 20:45

## 2023-12-09 RX ADMIN — EZETIMIBE 10 MG: 10 TABLET ORAL at 20:45

## 2023-12-09 SDOH — SOCIAL STABILITY: SOCIAL INSECURITY: HAS ANYONE EVER THREATENED TO HURT YOUR FAMILY OR YOUR PETS?: NO

## 2023-12-09 SDOH — SOCIAL STABILITY: SOCIAL INSECURITY: HAVE YOU HAD THOUGHTS OF HARMING ANYONE ELSE?: NO

## 2023-12-09 SDOH — SOCIAL STABILITY: SOCIAL INSECURITY: ABUSE: ADULT

## 2023-12-09 SDOH — SOCIAL STABILITY: SOCIAL INSECURITY: DO YOU FEEL ANYONE HAS EXPLOITED OR TAKEN ADVANTAGE OF YOU FINANCIALLY OR OF YOUR PERSONAL PROPERTY?: NO

## 2023-12-09 SDOH — SOCIAL STABILITY: SOCIAL INSECURITY: ARE THERE ANY APPARENT SIGNS OF INJURIES/BEHAVIORS THAT COULD BE RELATED TO ABUSE/NEGLECT?: NO

## 2023-12-09 SDOH — SOCIAL STABILITY: SOCIAL INSECURITY: DO YOU FEEL UNSAFE GOING BACK TO THE PLACE WHERE YOU ARE LIVING?: NO

## 2023-12-09 SDOH — SOCIAL STABILITY: SOCIAL INSECURITY: ARE YOU OR HAVE YOU BEEN THREATENED OR ABUSED PHYSICALLY, EMOTIONALLY, OR SEXUALLY BY ANYONE?: NO

## 2023-12-09 SDOH — SOCIAL STABILITY: SOCIAL INSECURITY: WERE YOU ABLE TO COMPLETE ALL THE BEHAVIORAL HEALTH SCREENINGS?: YES

## 2023-12-09 SDOH — SOCIAL STABILITY: SOCIAL INSECURITY: DOES ANYONE TRY TO KEEP YOU FROM HAVING/CONTACTING OTHER FRIENDS OR DOING THINGS OUTSIDE YOUR HOME?: NO

## 2023-12-09 ASSESSMENT — LIFESTYLE VARIABLES
HAVE YOU EVER FELT YOU SHOULD CUT DOWN ON YOUR DRINKING: NO
EVER HAD A DRINK FIRST THING IN THE MORNING TO STEADY YOUR NERVES TO GET RID OF A HANGOVER: NO
AUDIT-C TOTAL SCORE: 0
HAVE PEOPLE ANNOYED YOU BY CRITICIZING YOUR DRINKING: NO
SUBSTANCE_ABUSE_PAST_12_MONTHS: NO
HOW OFTEN DO YOU HAVE A DRINK CONTAINING ALCOHOL: NEVER
SKIP TO QUESTIONS 9-10: 1
REASON UNABLE TO ASSESS: NO
AUDIT-C TOTAL SCORE: 0
EVER FELT BAD OR GUILTY ABOUT YOUR DRINKING: NO
HOW OFTEN DO YOU HAVE 6 OR MORE DRINKS ON ONE OCCASION: NEVER
PRESCIPTION_ABUSE_PAST_12_MONTHS: NO
HOW MANY STANDARD DRINKS CONTAINING ALCOHOL DO YOU HAVE ON A TYPICAL DAY: PATIENT DOES NOT DRINK

## 2023-12-09 ASSESSMENT — COGNITIVE AND FUNCTIONAL STATUS - GENERAL
DAILY ACTIVITIY SCORE: 24
PATIENT BASELINE BEDBOUND: NO
DAILY ACTIVITIY SCORE: 24
MOVING TO AND FROM BED TO CHAIR: A LITTLE
MOBILITY SCORE: 24
MOBILITY SCORE: 22
TURNING FROM BACK TO SIDE WHILE IN FLAT BAD: A LITTLE

## 2023-12-09 ASSESSMENT — HEART SCORE
TROPONIN: LESS THAN OR EQUAL TO NORMAL LIMIT
HEART SCORE: 5
ECG: NORMAL
RISK FACTORS: >2 RISK FACTORS OR HX OF ATHEROSCLEROTIC DISEASE
AGE: 65+
HISTORY: MODERATELY SUSPICIOUS

## 2023-12-09 ASSESSMENT — PAIN SCALES - GENERAL
PAINLEVEL_OUTOF10: 0 - NO PAIN
PAINLEVEL_OUTOF10: 8
PAINLEVEL_OUTOF10: 2
PAINLEVEL_OUTOF10: 8
PAINLEVEL_OUTOF10: 0 - NO PAIN
PAINLEVEL_OUTOF10: 5 - MODERATE PAIN

## 2023-12-09 ASSESSMENT — COLUMBIA-SUICIDE SEVERITY RATING SCALE - C-SSRS
1. IN THE PAST MONTH, HAVE YOU WISHED YOU WERE DEAD OR WISHED YOU COULD GO TO SLEEP AND NOT WAKE UP?: NO
2. HAVE YOU ACTUALLY HAD ANY THOUGHTS OF KILLING YOURSELF?: NO
6. HAVE YOU EVER DONE ANYTHING, STARTED TO DO ANYTHING, OR PREPARED TO DO ANYTHING TO END YOUR LIFE?: NO

## 2023-12-09 ASSESSMENT — ACTIVITIES OF DAILY LIVING (ADL)
ADEQUATE_TO_COMPLETE_ADL: YES
LACK_OF_TRANSPORTATION: NO
TOILETING: INDEPENDENT
FEEDING YOURSELF: INDEPENDENT
BATHING: INDEPENDENT
HEARING - LEFT EAR: FUNCTIONAL
HEARING - RIGHT EAR: FUNCTIONAL
DRESSING YOURSELF: INDEPENDENT
WALKS IN HOME: INDEPENDENT
JUDGMENT_ADEQUATE_SAFELY_COMPLETE_DAILY_ACTIVITIES: YES
GROOMING: INDEPENDENT
PATIENT'S MEMORY ADEQUATE TO SAFELY COMPLETE DAILY ACTIVITIES?: YES

## 2023-12-09 ASSESSMENT — PAIN - FUNCTIONAL ASSESSMENT
PAIN_FUNCTIONAL_ASSESSMENT: 0-10

## 2023-12-09 ASSESSMENT — PATIENT HEALTH QUESTIONNAIRE - PHQ9
SUM OF ALL RESPONSES TO PHQ9 QUESTIONS 1 & 2: 0
2. FEELING DOWN, DEPRESSED OR HOPELESS: NOT AT ALL
1. LITTLE INTEREST OR PLEASURE IN DOING THINGS: NOT AT ALL

## 2023-12-09 ASSESSMENT — PAIN DESCRIPTION - ORIENTATION: ORIENTATION: POSTERIOR

## 2023-12-09 ASSESSMENT — PAIN DESCRIPTION - DESCRIPTORS
DESCRIPTORS: SHARP
DESCRIPTORS: ACHING
DESCRIPTORS: ACHING

## 2023-12-09 ASSESSMENT — PAIN DESCRIPTION - LOCATION
LOCATION: NECK
LOCATION: CHEST

## 2023-12-09 ASSESSMENT — PAIN DESCRIPTION - PAIN TYPE: TYPE: ACUTE PAIN

## 2023-12-09 NOTE — CONSULTS
Consults  History Of Present Illness:    Aristeo Rome is a 89 y.o. male presenting with chest pain, EKG with atrial fibrillation but no ischemic ST-T changes.  [place].  History was obtained from his family that is very fluent in English he has stage III chronic kidney disease CAD hypertension paroxysmal atrial fibrillation on Xarelto stroke in 2012.  He has intermittent chest pain for 3 days.  He is under stress and anxiety since there was a death in the family 4 days ago he states he was having chest pressure intermittently for 10 to 15 minutes regardless of whether he is at rest or walking radiating up into the jaws bilaterally and neck heaviness he describes the pain as 4-6 in intensity out of 10 the pain was not reproducible on exam the episodes been happening for 5 times a day.  He has a heart score elevated he also had flu symptoms with bodyaches shortness of breath since receiving flu vaccine he does not appear to be in acute distress chest x-ray was unremarkable patient follows with Dr. Owen Lowry whom I am covering and echocardiogram was ordered and is pending most of his previous EKG showed atrial fibrillation suggesting it is now persistent    According to Dr. Lowry's note he has a history of PCI to the LAD A-fib on Xarelto dyslipidemia previous stroke recurrent urinary tract infections he characterizes atrial fibrillation is chronic also some bilateral lower extremity edema which is chronic    Echocardiogram 4/9/14: EF 50-55%. Mild MR and TR.      PCI to the LAD 5/15/10: Promus 2.5 x 28 mm KEITH into the mid distal vessel.  LM nl. LCx 30% ostial. OM1 and OM2 normal. RCA dominant. 30% prox and mid. 40% distal.      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,N13.8)   · CAD (coronary artery disease) (414.00) (I25.10)   · Cervical pain (723.1) (M54.2)   · Chronic a-fib (427.31) (I48.20)   · Cough (786.2) (R05.9)   · Dermatitis (692.9) (L30.9)   · Dyslipidemia (272.4) (E78.5)   · Elevated BP without diagnosis of hypertension (796.2) (R03.0)   · Elevated LFTs (790.6) (R79.89)   · Encounter for immunization (V03.89) (Z23)   · Gastroesophageal reflux disease, esophagitis presence not specified (530.81) (K21.9)   · High serum creatine (790.99) (R79.89)   · History of pyelonephritis (V13.02) (Z87.448)   · Hypertension (401.9) (I10)   · 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)   · Internal hemorrhoids (455.0) (K64.8)   · Lateral epicondylitis of right elbow (726.32) (M77.11)   · Overweight with body mass index (BMI) of 26 to 26.9 in adult (278.02,V85.22)  (E66.3,Z68.26)   · Denied: History of Paroxysmal atrial fibrillation   · Peripheral neuropathy (356.9) (G62.9)   · Pyelonephritis, acute (590.10) (N10)   · Recurrent urinary tract infection (599.0) (N39.0)   · Right elbow pain (719.42) (M25.521)   · Right hand paresthesia (782.0) (R20.2)   · Runny nose (784.99) (R09.89)   · Seborrheic dermatitis (690.10) (L21.9)   · Sore throat (462) (J02.9)   · Stage 3a chronic kidney disease (585.3) (N18.31)   · UTI (urinary tract infection) (599.0) (N39.0)   · Vitamin D deficiency (268.9) (E55.9)   · Weakness (780.79) (R53.1)     Surgical History  Problems    · History of Lung surgery     Past Medical History  Problems    · History of Embolic disease of toe (444.22) (I74.3)   · Resolved Date: 28 Sep 2017   · History of cerebral infarction (V12.54) (Z86.73)   · History of cough   · Resolved Date: 22 Aug 2020   · History of other diseases of the circulatory system, not elsewhere classified (V12.59)  (Z86.79)   · Resolved Date: 28 Sep 2017   · History of urinary tract infection (V13.02) (Z87.440)   · Resolved Date: 22 May 2020   ·  "Denied: History of Paroxysmal atrial fibrillation   · Personal history of coronary atherosclerosis (V12.59) (Z86.79)     Current Meds     Medication Name Instruction   Clobetasol Propionate 0.05 % External Solution APPLY AND GENTLY MASSAGE INTO AFFECTED AREA(S) ONCE DAILY.   D3-50 1.25 MG (27661 UT) Oral Capsule TAKE 1 CAPSULE Weekly sundays   Diclofenac Sodium 1 % External Gel APPLY SPARINGLY TO AFFECTED AREA(S) ONCE DAILY   Ensure Oral Liquid USE AS DIRECTED.   Ezetimibe 10 MG Oral Tablet TAKE 1 TABLET AT BEDTIME.   Furosemide 20 MG Oral Tablet TAKE ONE TABLET BY MOUTH EVERY DAY AS NEEDED for leg swelling and pain   Gabapentin 300 MG Oral Capsule TAKE 1 (ONE) CAPSULE THREE TIMES DAILY, AS NEEDED   Lidocanna 4 % External Patch APPLY 1 PATCH Daily   Loratadine 10 MG Oral Tablet TAKE 1 TABLET DAILY.   Losartan Potassium 100 MG Oral Tablet TAKE ONE TABLET BY MOUTH DAILY   Multivitamin Adults 50+ Oral Tablet TAKE 1 TABLET DAILY.   Pantoprazole Sodium 40 MG Oral Tablet Delayed Release TAKE 1 TABLET DAILY.   Simvastatin 40 MG Oral Tablet TAKE 1 TABLET DAILY.   Xarelto 15 MG Oral Tablet TAKE ONE TABLET BY MOUTH ONCE DAILY      Allergies  Medication    · tramadol   Swelling; 29 Nov 2016; Recorded By: Kenzie Phan; 6/29/2020 4:07:33 PM   · Zoloft   Recorded By: Kenzie Phan; 6/29/2020 4:07:33 PM     Family History  Mother    · No pertinent family history  Father    · No pertinent family history     Social History  Problems    · Caffeine use (V49.89) (Z78.9)   ·    · Never smoker   · No alcohol use   · Retired from employment       Last Recorded Vitals:  Vitals:    12/09/23 1300 12/09/23 1400 12/09/23 1500 12/09/23 1552   BP: 134/72 125/75 127/72    BP Location: Right arm      Patient Position: Sitting      Pulse: 88 88 80    Resp: 16  18    Temp: 36.4 °C (97.5 °F)  37 °C (98.6 °F)    TempSrc: Temporal  Temporal    SpO2: 100% 99% 99%    Weight:    74 kg (163 lb 2.3 oz)   Height:    1.626 m (5' 4\") "       Last Labs:  CBC - 12/9/2023: 12:09 PM  7.4 13.5 222    41.0      CMP - 12/9/2023: 12:09 PM  8.8 7.5 18 --- 0.5   _ 3.8 23 63      PTT - No results in last year.  _   _ _     Troponin I, High Sensitivity   Date/Time Value Ref Range Status   12/09/2023 01:29 PM 8 0 - 20 ng/L Final   12/09/2023 12:09 PM 10 0 - 20 ng/L Final     Troponin I   Date/Time Value Ref Range Status   08/02/2022 09:27 PM 8 0 - 20 ng/L Final     Comment:     .  Less than 99th percentile of normal range cutoff-  Female and children under 18 years old <14 ng/L; Male <21 ng/L: Negative  Repeat testing should be performed if clinically indicated.   .  Female and children under 18 years old 14-50 ng/L; Male 21-50 ng/L:  Consistent with possible cardiac damage and possible increased clinical   risk. Serial measurements may help to assess extent of myocardial damage.   .  >50 ng/L: Consistent with cardiac damage, increased clinical risk and  myocardial infarction. Serial measurements may help assess extent of   myocardial damage.   .   NOTE: Children less than 1 year old may have higher baseline troponin   levels and results should be interpreted in conjunction with the overall   clinical context.   .  NOTE: Troponin I testing is performed using a different   testing methodology at [facility] than at other   [facility]. Direct result comparisons should only   be made within the same method.       BNP   Date/Time Value Ref Range Status   11/16/2022 04:30 PM 76 0 - 99 pg/mL Final     Comment:     .  <100 pg/mL - Heart failure unlikely  100-299 pg/mL - Intermediate probability of acute heart  .               failure exacerbation. Correlate with clinical  .               context and patient history.    >=300 pg/mL - Heart Failure likely. Correlate with clinical  .               context and patient history.  BNP testing is performed using different testing   methodology at [facility] than at other   system hospitals.  "Direct result comparisons should   only be made within the same method.     03/31/2022 05:01 PM 60 0 - 99 pg/mL Final     Comment:     .  <100 pg/mL - Heart failure unlikely  100-299 pg/mL - Intermediate probability of acute heart  .               failure exacerbation. Correlate with clinical  .               context and patient history.    >=300 pg/mL - Heart Failure likely. Correlate with clinical  .               context and patient history.  BNP testing is performed using different testing   methodology at [facility] than at other   [facility] hospitals. Direct result comparisons should   only be made within the same method.       VLDL   Date/Time Value Ref Range Status   04/10/2021 08:25 AM 21 0 - 40 mg/dL Final      Last I/O:  No intake/output data recorded.    Past Cardiology Tests (Last 3 Years):  EKG:  No results found for this or any previous visit from the past 1095 days.    Echo:  No results found for this or any previous visit from the past 1095 days.    Ejection Fractions:  No results found for: \"EF\"  Cath:  No results found for this or any previous visit from the past 1095 days.    Stress Test:  No results found for this or any previous visit from the past 1095 days.    Cardiac Imaging:  No results found for this or any previous visit from the past 1095 days.      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    Past Surgical History:  He has a past surgical history that includes Other surgical history (12/16/2020).      Social History:  He reports that he has never smoked. He has never used smokeless tobacco. He reports that he does not drink alcohol and does not use drugs.    Family History:  No family history on file.     Allergies:  Sertraline and Tramadol    Inpatient Medications:  Scheduled medications   Medication Dose Route Frequency    amLODIPine  2.5 mg oral Daily    aspirin  81 mg oral Daily    ezetimibe  10 mg oral Nightly    losartan  100 mg oral Daily    pantoprazole  40 mg oral Daily    perflutren lipid microspheres  0.5-10 mL of dilution intravenous Once in imaging    perflutren protein A microsphere  0.5 mL intravenous Once in imaging    polyethylene glycol  17 g oral Daily    [START ON 12/10/2023] rivaroxaban  15 mg oral Daily    sennosides-docusate sodium  2 tablet oral BID    simvastatin  40 mg oral Nightly     PRN medications   Medication    acetaminophen    Or    acetaminophen    Or    acetaminophen    bisacodyl     Continuous Medications   Medication Dose Last Rate     Outpatient Medications:  Current Outpatient Medications   Medication Instructions    amLODIPine (NORVASC) 2.5 mg, oral, Daily    aspirin 81 mg, oral, Daily    diclofenac sodium (Voltaren) 1 % gel gel 1 Application, Topical, 4 times daily    ezetimibe (Zetia) 10 mg tablet TAKE ONE TABLET BY MOUTH AT BEDTIME    furosemide (LASIX) 20 mg, oral, Daily PRN    lidocaine (Lidoderm) 5 % patch 1 patch, transdermal, Daily, Apply to painful area 12 hours per day, remove for 12 hours.    losartan (COZAAR) 100 mg, oral, Daily    nutritional drink (Ensure Active Muscle Health) liquid 414 mL, oral, Daily    pantoprazole (PROTONIX) 40 mg, oral, Daily, Do not crush, chew, or split    simvastatin (ZOCOR) 40 mg, oral, Daily    Xarelto 15 mg, oral, Daily     Results for orders placed or performed during the  hospital encounter of 12/09/23 (from the past 96 hour(s))   CBC and Auto Differential   Result Value Ref Range    WBC 7.4 4.4 - 11.3 x10*3/uL    nRBC 0.0 0.0 - 0.0 /100 WBCs    RBC 4.56 4.50 - 5.90 x10*6/uL    Hemoglobin 13.5 13.5 - 17.5 g/dL    Hematocrit 41.0 41.0 - 52.0 %    MCV 90 80 - 100 fL    MCH 29.6 26.0 - 34.0 pg    MCHC 32.9 32.0 - 36.0 g/dL    RDW 12.6 11.5 - 14.5 %    Platelets 222 150 - 450 x10*3/uL    Neutrophils % 65.8 40.0 - 80.0 %    Immature Granulocytes %, Automated 0.4 0.0 - 0.9 %    Lymphocytes % 23.0 13.0 - 44.0 %    Monocytes % 10.6 2.0 - 10.0 %    Eosinophils % 0.1 0.0 - 6.0 %    Basophils % 0.1 0.0 - 2.0 %    Neutrophils Absolute 4.89 1.60 - 5.50 x10*3/uL    Immature Granulocytes Absolute, Automated 0.03 0.00 - 0.50 x10*3/uL    Lymphocytes Absolute 1.71 0.80 - 3.00 x10*3/uL    Monocytes Absolute 0.79 0.05 - 0.80 x10*3/uL    Eosinophils Absolute 0.01 0.00 - 0.40 x10*3/uL    Basophils Absolute 0.01 0.00 - 0.10 x10*3/uL   Comprehensive metabolic panel   Result Value Ref Range    Glucose 134 (H) 74 - 99 mg/dL    Sodium 135 (L) 136 - 145 mmol/L    Potassium 3.5 3.5 - 5.3 mmol/L    Chloride 97 (L) 98 - 107 mmol/L    Bicarbonate 28 21 - 32 mmol/L    Anion Gap 14 10 - 20 mmol/L    Urea Nitrogen 56 (H) 6 - 23 mg/dL    Creatinine 2.70 (H) 0.50 - 1.30 mg/dL    eGFR 22 (L) >60 mL/min/1.73m*2    Calcium 8.8 8.6 - 10.3 mg/dL    Albumin 3.8 3.4 - 5.0 g/dL    Alkaline Phosphatase 63 33 - 136 U/L    Total Protein 7.5 6.4 - 8.2 g/dL    AST 18 9 - 39 U/L    Bilirubin, Total 0.5 0.0 - 1.2 mg/dL    ALT 23 10 - 52 U/L   Troponin I, High Sensitivity, Initial   Result Value Ref Range    Troponin I, High Sensitivity 10 0 - 20 ng/L   Lavender Top   Result Value Ref Range    Extra Tube Hold for add-ons.    Troponin, High Sensitivity, 1 Hour   Result Value Ref Range    Troponin I, High Sensitivity 8 0 - 20 ng/L        Physical Exam:  Subjective:   Examination:   General Examination:   General Appearance: Well  developed, well nourished, in no acute distress.   Head: normocephalic, atraumatic   Eyes: Anicteric sclera. Pupils are equally round and reactive to light.  Extraocular movements are intact.    Ears: normal   Oral: Cavity: mucosa moist.   Throat: clear.   Neck/Thyroid: neck supple, full range of motion, no cervical lymphadenopathy.   Skin: warm and dry, no suspicious lesions.    Heart: regular rate and rhythm, S1, S2 normal, no murmurs.   Lungs: clear to auscultation bilaterally.   Abdomen: soft, non-tender, non-distended, bowl sound present, normal.   Extremities: no clubbing, no cyanosis, no edema.   Neuro: non-focal, motor strength normal upper lower extremities, sensory exam intact.       Assessment/Plan   R07.9 chest pain over the last 3 to 4 days precipitated by death in family also has some vague flulike symptoms since vaccination his EKG shows chronic controlled A-fib with no ischemic EKG changes he has a high heart score but his troponins are negative he did have 1 remote stent and moderate multivessel disease he may warrant a functional cardiac study in the future  20 5.1 Z95.1 Remote drug-eluting stent as described above to the LAD Promus 2.5 x 2830% ostial circumflex 30% proximal RCA 40% distal EF 50% with mild MR TR  [id number] what appears to be chronic atrial fibrillation on Xarelto with previous stroke    His viral flu symptoms will be monitored and echo for the morning has been ordered  He wants a functional cardiac study in the near future in my estimation       Code Status:  Full Code    I spent 40 minutes in the professional and overall care of this patient.        Jm Julien MD

## 2023-12-09 NOTE — ED PROVIDER NOTES
HPI   Chief Complaint   Patient presents with    Flu Symptoms     Body aches, sob since receiving flu shot    Chest Pain       Patient is an 89-year-old male with history of CKD 3, CAD, HTN, paroxysmal A-fib on Xarelto, stroke in 2012 presenting to [facility] ED for left-sided chest pain that began yesterday.  Patient reports intermittent chest pain that started 3 days ago.  The patient speaks [place]ian, and his daughter has elected to be a , they do not want a .  Initially the daughter was filling in her own answer without asking her dad and I stop the interview and told her that she must ask him word for word what I am asking and repeat his answers verbatim.  She states she understands.   The daughter is under the impression this may be anxiety given the recent death in the family that they found out yesterday however the patient's symptoms have been going on for 3 to 4 days and I told her that if we call this anxiety we will miss an acute medical emergency which is why the questions must be asked exactly and I must hear the responses.  She understands the severity, and will ask exactly and translate word for word what he responds with.    The patient states has been having chest pressure lasting 10 to 15 minutes at a time whether he is at rest or walking around, that radiates up into his jaws bilaterally and neck feeling like a heaviness or squeezing sensation, and partially down his left arm as well.  He also experiences shortness of breath when this happens.  He has no palpitations during those times, no dizziness or lightheadedness, but just states it feels bad.  He would call the pain between a 4-6 out of 10, and again dissipates after 10 minutes.  It occurs several times per day, initially happening once or twice after the steroid injection he received 4 days ago but not 6.  Happening 4-5 times per day, and yesterday after hearing about the death of his loved 1, it happened more  frequently than that which is why he is here in the emergency room today.  He denies any nausea or sweats associated with the symptoms but again does have shortness of breath and radiation to the neck and the arms.  Denies any black tarry stools, no abdominal pain, no numbness or tingling in his extremities, and no new strokelike symptoms.                          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    Embolic disease of toe    Other conditions influencing health status 08/22/2020    History of cough    Personal history of other diseases of the circulatory system 04/18/2014    Personal history of coronary atherosclerosis    Personal history of other diseases of the circulatory system 09/25/2015    History of other diseases of the circulatory system, not elsewhere classified    Personal history of transient ischemic attack (TIA), and cerebral infarction without residual deficits 04/18/2014    History of cerebral infarction    Personal history of urinary (tract) infections 05/22/2020    History of urinary tract infection    Pyelonephritis, acute 11/13/2023    Seborrheic dermatitis 11/13/2023     Past Surgical History:   Procedure Laterality Date    OTHER SURGICAL HISTORY  12/16/2020    Lung surgery     No family history on file.  Social History     Tobacco Use    Smoking status: Never    Smokeless tobacco: Never   Vaping Use    Vaping Use: Never used   Substance Use Topics    Alcohol use: Never    Drug use: Never       Physical Exam   ED Triage Vitals [12/09/23 1135]   Temp Heart Rate Resp BP   36.6 °C (97.9 °F) 75 18 91/53      SpO2 Temp Source Heart Rate Source Patient Position   97 % Temporal Monitor Sitting      BP Location FiO2 (%)     Left arm --       Physical Exam  Constitutional:        Appearance: Normal appearance. He is normal weight.   HENT:      Head: Normocephalic and atraumatic.      Nose: Nose normal.      Mouth/Throat:      Mouth: Mucous membranes are moist.      Pharynx: Oropharynx is clear.   Eyes:      Extraocular Movements: Extraocular movements intact.      Conjunctiva/sclera: Conjunctivae normal.      Pupils: Pupils are equal, round, and reactive to light.   Cardiovascular:      Rate and Rhythm: Normal rate and regular rhythm.      Pulses: Normal pulses.      Heart sounds: Normal heart sounds.   Pulmonary:      Effort: Pulmonary effort is normal.      Breath sounds: Normal breath sounds.   Abdominal:      General: Abdomen is flat. Bowel sounds are normal.      Palpations: Abdomen is soft.   Musculoskeletal:         General: Normal range of motion.      Cervical back: Normal range of motion and neck supple.   Skin:     General: Skin is warm and dry.      Capillary Refill: Capillary refill takes less than 2 seconds.      Comments: Multiple linear scratch marks on bilateral upper extremities.   Neurological:      General: No focal deficit present.      Mental Status: He is alert and oriented to person, place, and time. Mental status is at baseline.   Psychiatric:         Mood and Affect: Mood normal.         Behavior: Behavior normal.         ED Course & MDM   ED Course as of 12/09/23 1415   Sat Dec 09, 2023   1229 EKG reviewed, rate of 94, QT/QTc 340/425, no AZ or QRS prolongation.  No acute ST changes noted.  Atrial fibrillation rhythm. [MI]   1414 Dr. Edgar spoke with Dr. Stern, who is on-call for cardiology on behalf of Dr. Lowry.  He recommended that if troponin increased on second series, to start heparin drip.  He recommended if troponin stabilized or decreased, to continue home Xarelto dose. [MI]      ED Course User Index  [MI] Anay West MD       Medical Decision Making  Patient is a 89 y.o. male who presents to [facility] ED for Flu Symptoms (Body aches, sob since  receiving flu shot) and Chest Pain. On initial ED evaluation, patient found to be in no acute distress. Per HPI, concern to evaluate and treat for ACS/PE rule out.  Obtaining cardiac relevant labs, EKG, chest x-ray.  Chest x-ray negative for any acute cardiopulmonary process.  All lab work reviewed, grossly unremarkable.  2/2 troponin series negative.  Dr. Stern, on consult for cardiology, recommended stress test when possible.  Patient will be admitted to general medicine service for evaluation and management until further cardiac workup can be completed.    The patient was seen by the resident/fellow.  I have personally performed a substantive portion of the encounter.  I have seen and examined the patient; agree with the workup, evaluation, MDM, management and diagnosis.  The care plan has been discussed with the resident/fellow; I have reviewed the resident/fellow's note and agree with the documented findings with the exception/addition of the following:    The patient has been compliant with his Xarelto therefore less likely pulmonary embolism.  Given the recent steroid injection, it may be medication reaction as well however given the patient's cardiac history, his heart score is a 5, and he will be admitted to the hospital for further cardiac evaluation.  EKG reveals no acute ST changes.  The patient is currently pain-free.  He took his last Xarelto dose last night, and his blood sugar still be thin today.  Will call his cardiologist who is Dr. Lowry as well as the admitting team for further evaluation.    I did have a conversation with the patient about CODE STATUS, and his daughter and family were there to translate as well.  They fully understand that if he has to have a cardiac catheterization he wants it.  The patient states if he goes into cardiac arrest he would not want intubation, and would want chest compressions knowing that he could probably have rib fractures, and prolonged hospital stay for  if he survives, and knows that the chance of coming back with cardiac arrest and CPR are low, he still wants it.  Therefore the patient is a full code.        Procedure  Procedures     Aany West MD  Resident  12/09/23 1417       Anay West MD  Resident  12/09/23 1456    The patient was seen by the resident/fellow.  I have personally performed a substantive portion of the encounter.  I have seen and examined the patient; agree with the workup, evaluation, MDM, management and diagnosis.  The care plan has been discussed with the resident; I have reviewed the resident’s note and agree with the documented findings.         Ismael Edgar, DO  12/09/23 2047

## 2023-12-09 NOTE — H&P
History Of Present Illness  Aristeo Rome is a 89 y.o. male with PMH of CKD 3, CAD, HTN, paroxysmal A-fib on Xarelto, stroke in 2012 (no residual deficits) presents to the ED with 1 day history of worsening left-sided chest pain.  Patient is [place] and formal  services were requested however due to technical issues, family provided translation services.  Patient reports that he has had intermittent left side pressure-like chest pain that has been occurring intermittently greater than 2 weeks.  Reports pain is usually nonexertional, left-sided, pressure-like, 8 out of 10 at worst, sometimes with radiation to both arms.  Family reports that patient is very anxious and there was a recent death in the family yesterday and family feels this is more a culprit to the presentation as they report he gets very anxious and easily stressed at times.  Patient reports chest pain has mostly resolved at time of interview, described it as a dull ache.  He also reports longstanding issues with constipation for many years.  Did have a small bowel movement today but previously had no bowel movement for 3-4 days.  States this is typical for him.  He denied headache, dizziness, shortness of breath, abdominal pain, nausea or vomiting.  Family does report that for years patient has had very poor appetite even to his native [place] food.  He does drink Ensure at home.  Family is concerned he is becoming dehydrated.  Family reports his weight is stable.    In the ED, vitals stable.  Troponin negative.  EKG without acute ST changes.  Patient given a dose of aspirin and admitted for further cardiac workup.     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    Surgical History  He has a past surgical history that includes Other surgical history (12/16/2020).     Social History  He reports that he has never smoked. He has never used smokeless tobacco. He reports that he does not drink alcohol and does not use drugs.    Family History  No family history on file.     Allergies  Sertraline and Tramadol    Review of Systems   ROS: 12 systems reviewed and negative except per HPI above     Physical Exam by System:     Last Recorded Vitals  Blood pressure 127/72, pulse 80, temperature 37 °C (98.6 °F), temperature source Temporal, resp. rate 18, height 1.626 m (5' 4\"), weight 72.6 kg (160 lb), SpO2 99 %.    Physical Exam     Constitutional: Well developed, awake/alert/oriented x3, no distress, alert and cooperative  HEENT: anicteric sclera, eomi, no oral lesions noted, moist orophraynx, no thyromegaly  Cardiovascular: Regular, rate and rhythm, no murmurs, 2+ equal pulses of the extremities, normal S1 and S2  Respiratory/Thorax: Patent airways, CTAB, normal breath sounds with good chest expansion, thorax symmetric, no conversational dyspnea, RA  Gastrointestinal: +BS, slightly distended, soft, non-tender, no rebound tenderness or guarding, no masses palpable, no organomegaly  Musculoskeletal: ROM intact, no joint swelling, normal strength  Extremities: normal extremities, no cyanosis, edema, contusions or wounds, no clubbing  Skin: warm and dry. No rashes nor lesions noted  Neurological: alert and oriented x3, intact senses, motor, response and reflexes, normal strength, follows commands, clear speech, no facial droop, 5/5 " strength    Relevant Results  Results for orders placed or performed during the hospital encounter of 12/09/23 (from the past 24 hour(s))   CBC and Auto Differential   Result Value Ref Range    WBC 7.4 4.4 - 11.3 x10*3/uL    nRBC 0.0 0.0 - 0.0 /100 WBCs    RBC 4.56 4.50 - 5.90 x10*6/uL    Hemoglobin 13.5 13.5 - 17.5 g/dL    Hematocrit 41.0 41.0 - 52.0 %    MCV 90 80 - 100 fL    MCH 29.6 26.0 - 34.0 pg    MCHC 32.9 32.0 - 36.0 g/dL    RDW 12.6 11.5 - 14.5 %    Platelets 222 150 - 450 x10*3/uL    Neutrophils % 65.8 40.0 - 80.0 %    Immature Granulocytes %, Automated 0.4 0.0 - 0.9 %    Lymphocytes % 23.0 13.0 - 44.0 %    Monocytes % 10.6 2.0 - 10.0 %    Eosinophils % 0.1 0.0 - 6.0 %    Basophils % 0.1 0.0 - 2.0 %    Neutrophils Absolute 4.89 1.60 - 5.50 x10*3/uL    Immature Granulocytes Absolute, Automated 0.03 0.00 - 0.50 x10*3/uL    Lymphocytes Absolute 1.71 0.80 - 3.00 x10*3/uL    Monocytes Absolute 0.79 0.05 - 0.80 x10*3/uL    Eosinophils Absolute 0.01 0.00 - 0.40 x10*3/uL    Basophils Absolute 0.01 0.00 - 0.10 x10*3/uL   Comprehensive metabolic panel   Result Value Ref Range    Glucose 134 (H) 74 - 99 mg/dL    Sodium 135 (L) 136 - 145 mmol/L    Potassium 3.5 3.5 - 5.3 mmol/L    Chloride 97 (L) 98 - 107 mmol/L    Bicarbonate 28 21 - 32 mmol/L    Anion Gap 14 10 - 20 mmol/L    Urea Nitrogen 56 (H) 6 - 23 mg/dL    Creatinine 2.70 (H) 0.50 - 1.30 mg/dL    eGFR 22 (L) >60 mL/min/1.73m*2    Calcium 8.8 8.6 - 10.3 mg/dL    Albumin 3.8 3.4 - 5.0 g/dL    Alkaline Phosphatase 63 33 - 136 U/L    Total Protein 7.5 6.4 - 8.2 g/dL    AST 18 9 - 39 U/L    Bilirubin, Total 0.5 0.0 - 1.2 mg/dL    ALT 23 10 - 52 U/L   Troponin I, High Sensitivity, Initial   Result Value Ref Range    Troponin I, High Sensitivity 10 0 - 20 ng/L   Lavender Top   Result Value Ref Range    Extra Tube Hold for add-ons.    Troponin, High Sensitivity, 1 Hour   Result Value Ref Range    Troponin I, High Sensitivity 8 0 - 20 ng/L      Scheduled  medications  amLODIPine, 2.5 mg, oral, Daily  aspirin, 81 mg, oral, Daily  ezetimibe, 10 mg, oral, Nightly  losartan, 100 mg, oral, Daily  pantoprazole, 40 mg, oral, Daily  perflutren lipid microspheres, 0.5-10 mL of dilution, intravenous, Once in imaging  perflutren protein A microsphere, 0.5 mL, intravenous, Once in imaging  polyethylene glycol, 17 g, oral, Daily  [START ON 12/10/2023] rivaroxaban, 15 mg, oral, Daily  sennosides-docusate sodium, 2 tablet, oral, BID  simvastatin, 40 mg, oral, Nightly      Continuous medications     PRN medications  PRN medications: acetaminophen **OR** acetaminophen **OR** acetaminophen, bisacodyl     XR chest 2 views    Result Date: 12/9/2023  Interpreted By:  Blas Christie, STUDY: XR CHEST 2 VIEWS;  12/9/2023 1:15 pm   INDICATION: Signs/Symptoms:CP.   COMPARISON: 01/09/2021   ACCESSION NUMBER(S): [id number]   ORDERING CLINICIAN: ROBERTO CHE   FINDINGS: CARDIOMEDIASTINAL SILHOUETTE AND VASCULATURE:   Cardiac size:  Within normal limits.   Aortic shadow:  Within normal limits.   Mediastinal contours: Within normal limits.   Pulmonary vasculature:  The central vasculature is unremarkable   LUNGS: Lungs are clear.   ABDOMEN AND OTHER FINDINGS: No remarkable upper abdominal findings.   BONES: No acute osseous changes.       1.  No active cardiopulmonary disease.  There has not been significant interval change from the prior exam.   Signed by: Blas Christie 12/9/2023 1:24 PM Dictation workstation:   [id number]          Assessment/Plan   Principal Problem:    Chest pain      Pt is an 89 year old male with PMH of CKD 3, CAD, HTN, paroxysmal A-fib on Xarelto, stroke in 2012 (no residual deficits) presents to the ED with 1 day history of worsening left-sided chest pain.  Patient is [place] and formal  services were requested however due to technical issues, family provided translation services.  Admitted for further cardiac evaluation.    #Chest  pain  #CAD  #HTN  #Constipation  #Afib-on xarelto  #CKD3  #hx of CVA    -pt presents with subacute chest pain that worsened in the past day. Chest pain mostly now resolved. Appears comfortable on exam. No acute ST changes. Trop negative x2  -given asp in the ED  -requested ED team give sublingual nitroglycerin and assess for any interval response  -trend trop  -monitor on tele  -echo ordered  -cardio consulted, appreciate input  -continue home medications  -continue home xarelto    PIV  On home AC    Dispo:home    CODE STATUS; FULL CODE-confirmed with family             SIGNATURE: Yuli Hernandez MD PATIENT NAME: Aristeo Rome   DATE: December 9, 2023 MRN: [id number]   TIME: 3:38 PM

## 2023-12-10 LAB
ANION GAP SERPL CALC-SCNC: 12 MMOL/L (ref 10–20)
BUN SERPL-MCNC: 37 MG/DL (ref 6–23)
CALCIUM SERPL-MCNC: 8.8 MG/DL (ref 8.6–10.3)
CARDIAC TROPONIN I PNL SERPL HS: 8 NG/L (ref 0–20)
CHLORIDE SERPL-SCNC: 102 MMOL/L (ref 98–107)
CO2 SERPL-SCNC: 26 MMOL/L (ref 21–32)
CREAT SERPL-MCNC: 1.66 MG/DL (ref 0.5–1.3)
ERYTHROCYTE [DISTWIDTH] IN BLOOD BY AUTOMATED COUNT: 12.5 % (ref 11.5–14.5)
GFR SERPL CREATININE-BSD FRML MDRD: 39 ML/MIN/1.73M*2
GLUCOSE SERPL-MCNC: 100 MG/DL (ref 74–99)
HCT VFR BLD AUTO: 41.2 % (ref 41–52)
HGB BLD-MCNC: 13.6 G/DL (ref 13.5–17.5)
MAGNESIUM SERPL-MCNC: 2.22 MG/DL (ref 1.6–2.4)
MCH RBC QN AUTO: 29.7 PG (ref 26–34)
MCHC RBC AUTO-ENTMCNC: 33 G/DL (ref 32–36)
MCV RBC AUTO: 90 FL (ref 80–100)
NRBC BLD-RTO: 0 /100 WBCS (ref 0–0)
PLATELET # BLD AUTO: 213 X10*3/UL (ref 150–450)
POTASSIUM SERPL-SCNC: 3.7 MMOL/L (ref 3.5–5.3)
RBC # BLD AUTO: 4.58 X10*6/UL (ref 4.5–5.9)
SODIUM SERPL-SCNC: 136 MMOL/L (ref 136–145)
WBC # BLD AUTO: 6.7 X10*3/UL (ref 4.4–11.3)

## 2023-12-10 PROCEDURE — 80048 BASIC METABOLIC PNL TOTAL CA: CPT | Performed by: STUDENT IN AN ORGANIZED HEALTH CARE EDUCATION/TRAINING PROGRAM

## 2023-12-10 PROCEDURE — 85027 COMPLETE CBC AUTOMATED: CPT | Performed by: STUDENT IN AN ORGANIZED HEALTH CARE EDUCATION/TRAINING PROGRAM

## 2023-12-10 PROCEDURE — 2500000004 HC RX 250 GENERAL PHARMACY W/ HCPCS (ALT 636 FOR OP/ED): Performed by: STUDENT IN AN ORGANIZED HEALTH CARE EDUCATION/TRAINING PROGRAM

## 2023-12-10 PROCEDURE — 2500000001 HC RX 250 WO HCPCS SELF ADMINISTERED DRUGS (ALT 637 FOR MEDICARE OP): Performed by: STUDENT IN AN ORGANIZED HEALTH CARE EDUCATION/TRAINING PROGRAM

## 2023-12-10 PROCEDURE — 99233 SBSQ HOSP IP/OBS HIGH 50: CPT | Performed by: STUDENT IN AN ORGANIZED HEALTH CARE EDUCATION/TRAINING PROGRAM

## 2023-12-10 PROCEDURE — 83735 ASSAY OF MAGNESIUM: CPT | Performed by: STUDENT IN AN ORGANIZED HEALTH CARE EDUCATION/TRAINING PROGRAM

## 2023-12-10 PROCEDURE — 36415 COLL VENOUS BLD VENIPUNCTURE: CPT | Performed by: STUDENT IN AN ORGANIZED HEALTH CARE EDUCATION/TRAINING PROGRAM

## 2023-12-10 PROCEDURE — 84484 ASSAY OF TROPONIN QUANT: CPT | Performed by: STUDENT IN AN ORGANIZED HEALTH CARE EDUCATION/TRAINING PROGRAM

## 2023-12-10 PROCEDURE — 2500000002 HC RX 250 W HCPCS SELF ADMINISTERED DRUGS (ALT 637 FOR MEDICARE OP, ALT 636 FOR OP/ED): Performed by: STUDENT IN AN ORGANIZED HEALTH CARE EDUCATION/TRAINING PROGRAM

## 2023-12-10 PROCEDURE — G0378 HOSPITAL OBSERVATION PER HR: HCPCS

## 2023-12-10 RX ORDER — BISACODYL 10 MG/1
10 SUPPOSITORY RECTAL DAILY
Status: DISCONTINUED | OUTPATIENT
Start: 2023-12-10 | End: 2023-12-12 | Stop reason: HOSPADM

## 2023-12-10 RX ORDER — CALCIUM CARBONATE 200(500)MG
500 TABLET,CHEWABLE ORAL 4 TIMES DAILY PRN
Status: DISCONTINUED | OUTPATIENT
Start: 2023-12-10 | End: 2023-12-12 | Stop reason: HOSPADM

## 2023-12-10 RX ADMIN — LOSARTAN POTASSIUM 100 MG: 100 TABLET, FILM COATED ORAL at 09:18

## 2023-12-10 RX ADMIN — RIVAROXABAN 15 MG: 15 TABLET, FILM COATED ORAL at 09:18

## 2023-12-10 RX ADMIN — ASPIRIN 81 MG: 81 TABLET, COATED ORAL at 09:18

## 2023-12-10 RX ADMIN — SENNOSIDES AND DOCUSATE SODIUM 2 TABLET: 8.6; 5 TABLET ORAL at 20:19

## 2023-12-10 RX ADMIN — AMLODIPINE BESYLATE 2.5 MG: 2.5 TABLET ORAL at 09:18

## 2023-12-10 RX ADMIN — PANTOPRAZOLE SODIUM 40 MG: 40 TABLET, DELAYED RELEASE ORAL at 09:18

## 2023-12-10 RX ADMIN — EZETIMIBE 10 MG: 10 TABLET ORAL at 20:19

## 2023-12-10 RX ADMIN — SIMVASTATIN 40 MG: 40 TABLET, FILM COATED ORAL at 20:19

## 2023-12-10 RX ADMIN — ACETAMINOPHEN 650 MG: 325 TABLET ORAL at 20:20

## 2023-12-10 ASSESSMENT — COGNITIVE AND FUNCTIONAL STATUS - GENERAL
MOBILITY SCORE: 22
TURNING FROM BACK TO SIDE WHILE IN FLAT BAD: A LITTLE
MOVING TO AND FROM BED TO CHAIR: A LITTLE
MOBILITY SCORE: 22
MOVING TO AND FROM BED TO CHAIR: A LITTLE
DAILY ACTIVITIY SCORE: 24
TURNING FROM BACK TO SIDE WHILE IN FLAT BAD: A LITTLE
DAILY ACTIVITIY SCORE: 24

## 2023-12-10 ASSESSMENT — PAIN - FUNCTIONAL ASSESSMENT
PAIN_FUNCTIONAL_ASSESSMENT: 0-10
PAIN_FUNCTIONAL_ASSESSMENT: 0-10

## 2023-12-10 ASSESSMENT — PAIN DESCRIPTION - ORIENTATION: ORIENTATION: POSTERIOR

## 2023-12-10 ASSESSMENT — PAIN SCALES - GENERAL
PAINLEVEL_OUTOF10: 3
PAINLEVEL_OUTOF10: 8

## 2023-12-10 ASSESSMENT — PAIN DESCRIPTION - LOCATION: LOCATION: NECK

## 2023-12-10 NOTE — CARE PLAN
The patient's goals for the shift include  No chest pain this shift    The clinical goals for the shift include See POC    Over the shift, the patient did not make progress toward the following goals. Barriers to progression include language barrier/patient understanding.   Recommendations to address these barriers include continue with  as needed.      Problem: Fall/Injury  Goal: Not fall by end of shift  Outcome: Progressing  Goal: Be free from injury by end of the shift  Outcome: Progressing  Goal: Verbalize understanding of personal risk factors for fall in the hospital  Outcome: Progressing  Goal: Verbalize understanding of risk factor reduction measures to prevent injury from fall in the home  Outcome: Progressing  Goal: Use assistive devices by end of the shift  Outcome: Progressing  Goal: Pace activities to prevent fatigue by end of the shift  Outcome: Progressing     Problem: Pain  Goal: My pain/discomfort is manageable  Outcome: Progressing     Problem: Safety  Goal: Patient will be injury free during hospitalization  Outcome: Progressing  Goal: I will remain free of falls  Outcome: Progressing     Problem: Pain  Goal: Takes deep breaths with improved pain control throughout the shift  Outcome: Progressing  Goal: Turns in bed with improved pain control throughout the shift  Outcome: Progressing  Goal: Walks with improved pain control throughout the shift  Outcome: Progressing  Goal: Performs ADL's with improved pain control throughout shift  Outcome: Progressing  Goal: Participates in PT with improved pain control throughout the shift  Outcome: Progressing  Goal: Free from opioid side effects throughout the shift  Outcome: Progressing  Goal: Free from acute confusion related to pain meds throughout the shift  Outcome: Progressing

## 2023-12-10 NOTE — NURSING NOTE
EOS note:  No acute events this shift.   VSS.   Pt. Continent and calling appropriately for assistance.  Able to communicate simple needs through the night.  Pt. Medicated with tyelonol once this shift and ordered a lidocaine patch to posterior neck.  Pt. Expressed relief and rested comfortably last night.   Tele without abnormal rhythm through the night.  Pt's family to be contacted with any questions day or night.

## 2023-12-10 NOTE — PROGRESS NOTES
"Aristeo Rome is a 89 y.o. male on day 0 of admission presenting with Chest pain.    Subjective   No overnight events.  Family at bedside this morning.  Patient reports chest discomfort and bilateral arm discomfort is slightly better than yesterday.  Denies nausea or vomiting.  Denies abdominal pain.  Today states he feels the chest pain is more a muscular pain.  He does continue to have poor appetite which is longstanding per family.       Objective     Physical Exam    Constitutional: Well developed, awake/alert/oriented x3, no distress, alert and cooperative, sitting in bed  HEENT: anicteric sclera, eomi, no oral lesions noted, moist orophraynx  Cardiovascular: Regular, rate and rhythm, no murmurs, 2+ equal pulses of the extremities, normal S1 and S2  Respiratory/Thorax: Patent airways, CTAB, normal breath sounds with good chest expansion, thorax symmetric, no conversational dyspnea, RA  Gastrointestinal: +BS, Nondistended, soft, non-tender, no rebound tenderness or guarding, no masses palpable, no organomegaly  Musculoskeletal: ROM intact, no joint swelling, normal strength  Extremities: normal extremities, no cyanosis, edema, contusions or wounds, no clubbing  Skin: warm and dry. No rashes nor lesions noted  Neurological: alert and oriented x3, intact senses, motor, response and reflexes, normal strength, follows commands, clear speech, no facial droop, 5/5 strength    Last Recorded Vitals  Blood pressure 129/87, pulse 77, temperature 35.9 °C (96.6 °F), temperature source Temporal, resp. rate 16, height 1.626 m (5' 4\"), weight 74 kg (163 lb 2.3 oz), SpO2 98 %.  Intake/Output last 3 Shifts:  No intake/output data recorded.    Relevant Results  Scheduled medications  amLODIPine, 2.5 mg, oral, Daily  aspirin, 81 mg, oral, Daily  bisacodyl, 10 mg, rectal, Daily  ezetimibe, 10 mg, oral, Nightly  lidocaine, 1 patch, transdermal, Daily  losartan, 100 mg, oral, Daily  pantoprazole, 40 mg, oral, Daily  perflutren lipid " microspheres, 0.5-10 mL of dilution, intravenous, Once in imaging  perflutren protein A microsphere, 0.5 mL, intravenous, Once in imaging  polyethylene glycol, 17 g, oral, Daily  rivaroxaban, 15 mg, oral, Daily  sennosides-docusate sodium, 2 tablet, oral, BID  simvastatin, 40 mg, oral, Nightly      Continuous medications     PRN medications  PRN medications: acetaminophen **OR** acetaminophen **OR** acetaminophen  Results from last 7 days   Lab Units 12/10/23  0741 12/09/23  1209   WBC AUTO x10*3/uL 6.7 7.4   RBC AUTO x10*6/uL 4.58 4.56   HEMOGLOBIN g/dL 13.6 13.5     Results from last 7 days   Lab Units 12/10/23  0741 12/09/23  1209   SODIUM mmol/L 136 135*   POTASSIUM mmol/L 3.7 3.5   CHLORIDE mmol/L 102 97*   CO2 mmol/L 26 28   BUN mg/dL 37* 56*   CREATININE mg/dL 1.66* 2.70*   CALCIUM mg/dL 8.8 8.8   MAGNESIUM mg/dL 2.22  --    BILIRUBIN TOTAL mg/dL  --  0.5   ALT U/L  --  23   AST U/L  --  18       XR chest 2 views    Result Date: 12/9/2023  Interpreted By:  Blas Christie, STUDY: XR CHEST 2 VIEWS;  12/9/2023 1:15 pm   INDICATION: Signs/Symptoms:CP.   COMPARISON: 01/09/2021   ACCESSION NUMBER(S): [id number]   ORDERING CLINICIAN: ROBERTO CHE   FINDINGS: CARDIOMEDIASTINAL SILHOUETTE AND VASCULATURE:   Cardiac size:  Within normal limits.   Aortic shadow:  Within normal limits.   Mediastinal contours: Within normal limits.   Pulmonary vasculature:  The central vasculature is unremarkable   LUNGS: Lungs are clear.   ABDOMEN AND OTHER FINDINGS: No remarkable upper abdominal findings.   BONES: No acute osseous changes.       1.  No active cardiopulmonary disease.  There has not been significant interval change from the prior exam.   Signed by: Blas Christie 12/9/2023 1:24 PM Dictation workstation:   [id number]       Assessment/Plan   Principal Problem:    Chest pain      Pt is an 89 year old [place] male with PMH of CKD 3, CAD, HTN, paroxysmal A-fib on Xarelto, stroke in 2012 (no residual deficits) presents to  the ED with 1 day history of worsening left-sided chest pain. Admitted for further cardiac evaluation.     #Chest pain  #CAD  #HTN  #Constipation  #Afib-on xarelto  #CKD3  #hx of CVA     -pt presents with subacute chest pain that worsened in the past day. Chest pain mostly now resolved. Appears comfortable on exam. No acute ST changes. Trop negative x2  -given asp in the ED  -requested ED team give sublingual nitroglycerin and assess for any interval response  -trend trop  -monitor on tele  -echo ordered-pending  -cardio consulted, appreciate input-->tentatively planned for further cardiac study on 12/10  -continue home medications  -continue home xarelto  -add daily bisacodyl for ongoing constipation issues     PIV  On home AC     Dispo:home     CODE STATUS; FULL CODE-confirmed with family                    Yuli Hernandez MD

## 2023-12-11 ENCOUNTER — APPOINTMENT (OUTPATIENT)
Dept: RADIOLOGY | Facility: HOSPITAL | Age: 88
End: 2023-12-11
Payer: COMMERCIAL

## 2023-12-11 ENCOUNTER — APPOINTMENT (OUTPATIENT)
Dept: CARDIOLOGY | Facility: HOSPITAL | Age: 88
End: 2023-12-11
Payer: COMMERCIAL

## 2023-12-11 LAB
ANION GAP SERPL CALC-SCNC: 9 MMOL/L (ref 10–20)
BUN SERPL-MCNC: 33 MG/DL (ref 6–23)
CALCIUM SERPL-MCNC: 9.1 MG/DL (ref 8.6–10.3)
CHLORIDE SERPL-SCNC: 103 MMOL/L (ref 98–107)
CO2 SERPL-SCNC: 27 MMOL/L (ref 21–32)
CREAT SERPL-MCNC: 1.56 MG/DL (ref 0.5–1.3)
ERYTHROCYTE [DISTWIDTH] IN BLOOD BY AUTOMATED COUNT: 12.6 % (ref 11.5–14.5)
GFR SERPL CREATININE-BSD FRML MDRD: 42 ML/MIN/1.73M*2
GLUCOSE SERPL-MCNC: 110 MG/DL (ref 74–99)
HCT VFR BLD AUTO: 42.4 % (ref 41–52)
HGB BLD-MCNC: 13.7 G/DL (ref 13.5–17.5)
MAGNESIUM SERPL-MCNC: 2.27 MG/DL (ref 1.6–2.4)
MCH RBC QN AUTO: 29.7 PG (ref 26–34)
MCHC RBC AUTO-ENTMCNC: 32.3 G/DL (ref 32–36)
MCV RBC AUTO: 92 FL (ref 80–100)
NRBC BLD-RTO: 0 /100 WBCS (ref 0–0)
PLATELET # BLD AUTO: 201 X10*3/UL (ref 150–450)
POTASSIUM SERPL-SCNC: 4 MMOL/L (ref 3.5–5.3)
RBC # BLD AUTO: 4.62 X10*6/UL (ref 4.5–5.9)
SODIUM SERPL-SCNC: 135 MMOL/L (ref 136–145)
WBC # BLD AUTO: 7 X10*3/UL (ref 4.4–11.3)

## 2023-12-11 PROCEDURE — 85027 COMPLETE CBC AUTOMATED: CPT | Performed by: STUDENT IN AN ORGANIZED HEALTH CARE EDUCATION/TRAINING PROGRAM

## 2023-12-11 PROCEDURE — G0378 HOSPITAL OBSERVATION PER HR: HCPCS

## 2023-12-11 PROCEDURE — 2500000005 HC RX 250 GENERAL PHARMACY W/O HCPCS: Performed by: INTERNAL MEDICINE

## 2023-12-11 PROCEDURE — 93005 ELECTROCARDIOGRAM TRACING: CPT

## 2023-12-11 PROCEDURE — 93306 TTE W/DOPPLER COMPLETE: CPT | Performed by: INTERNAL MEDICINE

## 2023-12-11 PROCEDURE — 2500000004 HC RX 250 GENERAL PHARMACY W/ HCPCS (ALT 636 FOR OP/ED): Performed by: STUDENT IN AN ORGANIZED HEALTH CARE EDUCATION/TRAINING PROGRAM

## 2023-12-11 PROCEDURE — 93306 TTE W/DOPPLER COMPLETE: CPT

## 2023-12-11 PROCEDURE — 36415 COLL VENOUS BLD VENIPUNCTURE: CPT | Performed by: STUDENT IN AN ORGANIZED HEALTH CARE EDUCATION/TRAINING PROGRAM

## 2023-12-11 PROCEDURE — 99233 SBSQ HOSP IP/OBS HIGH 50: CPT | Performed by: STUDENT IN AN ORGANIZED HEALTH CARE EDUCATION/TRAINING PROGRAM

## 2023-12-11 PROCEDURE — 83735 ASSAY OF MAGNESIUM: CPT | Performed by: STUDENT IN AN ORGANIZED HEALTH CARE EDUCATION/TRAINING PROGRAM

## 2023-12-11 PROCEDURE — 2500000002 HC RX 250 W HCPCS SELF ADMINISTERED DRUGS (ALT 637 FOR MEDICARE OP, ALT 636 FOR OP/ED): Performed by: STUDENT IN AN ORGANIZED HEALTH CARE EDUCATION/TRAINING PROGRAM

## 2023-12-11 PROCEDURE — 80048 BASIC METABOLIC PNL TOTAL CA: CPT | Performed by: STUDENT IN AN ORGANIZED HEALTH CARE EDUCATION/TRAINING PROGRAM

## 2023-12-11 PROCEDURE — 2500000001 HC RX 250 WO HCPCS SELF ADMINISTERED DRUGS (ALT 637 FOR MEDICARE OP): Performed by: STUDENT IN AN ORGANIZED HEALTH CARE EDUCATION/TRAINING PROGRAM

## 2023-12-11 RX ADMIN — LOSARTAN POTASSIUM 100 MG: 100 TABLET, FILM COATED ORAL at 08:43

## 2023-12-11 RX ADMIN — ACETAMINOPHEN 650 MG: 325 TABLET ORAL at 21:42

## 2023-12-11 RX ADMIN — SENNOSIDES AND DOCUSATE SODIUM 2 TABLET: 8.6; 5 TABLET ORAL at 08:42

## 2023-12-11 RX ADMIN — RIVAROXABAN 15 MG: 15 TABLET, FILM COATED ORAL at 08:42

## 2023-12-11 RX ADMIN — BISACODYL 10 MG: 10 SUPPOSITORY RECTAL at 06:00

## 2023-12-11 RX ADMIN — AMLODIPINE BESYLATE 2.5 MG: 2.5 TABLET ORAL at 08:43

## 2023-12-11 RX ADMIN — LIDOCAINE 1 PATCH: 4 PATCH TOPICAL at 08:43

## 2023-12-11 RX ADMIN — SENNOSIDES AND DOCUSATE SODIUM 2 TABLET: 8.6; 5 TABLET ORAL at 20:26

## 2023-12-11 RX ADMIN — BISACODYL 10 MG: 10 SUPPOSITORY RECTAL at 08:43

## 2023-12-11 RX ADMIN — SIMVASTATIN 40 MG: 40 TABLET, FILM COATED ORAL at 20:26

## 2023-12-11 RX ADMIN — ASPIRIN 81 MG: 81 TABLET, COATED ORAL at 08:43

## 2023-12-11 RX ADMIN — POLYETHYLENE GLYCOL 3350 17 G: 17 POWDER, FOR SOLUTION ORAL at 08:42

## 2023-12-11 RX ADMIN — EZETIMIBE 10 MG: 10 TABLET ORAL at 20:26

## 2023-12-11 RX ADMIN — PANTOPRAZOLE SODIUM 40 MG: 40 TABLET, DELAYED RELEASE ORAL at 08:43

## 2023-12-11 ASSESSMENT — COGNITIVE AND FUNCTIONAL STATUS - GENERAL
MOVING TO AND FROM BED TO CHAIR: A LITTLE
TURNING FROM BACK TO SIDE WHILE IN FLAT BAD: A LITTLE
MOBILITY SCORE: 22
DAILY ACTIVITIY SCORE: 24

## 2023-12-11 ASSESSMENT — PAIN SCALES - GENERAL
PAINLEVEL_OUTOF10: 0 - NO PAIN
PAINLEVEL_OUTOF10: 1
PAINLEVEL_OUTOF10: 0 - NO PAIN

## 2023-12-11 ASSESSMENT — PAIN - FUNCTIONAL ASSESSMENT: PAIN_FUNCTIONAL_ASSESSMENT: 0-10

## 2023-12-11 NOTE — CARE PLAN
12/11/2023    0800- Patient off floor to nuclear medicine.   0845- Patient returned to 3016.  1000- Transferred to room 3110. Report given to NEREYDA Cueva. Spoke with Dr. Hernandez. Family at bedside.      Nica Quinteros LPN

## 2023-12-11 NOTE — PROGRESS NOTES
Subjective Data:  Presented with chest pain episode  Chronic kidney disease  A-fib on Xarelto  Prior stroke  [place] language barrier daughter translated speaks English  Chronic kidney disease  Ordered functional test for tomorrow  Sees Owen Lowry previous LAD PCI    Echocardiogram 4/9/14: EF 50-55%. Mild MR and TR.      PCI to the LAD 5/15/10: Promus 2.5 x 28 mm KEITH into the mid distal vessel.  LM nl. LCx 30% ostial. OM1 and OM2 normal. RCA dominant. 30% prox and mid. 40% distal.      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,N13.8)   · CAD (coronary artery disease) (414.00) (I25.10)   · Cervical pain (723.1) (M54.2)   · Chronic a-fib (427.31) (I48.20)   · Cough (786.2) (R05.9)   · Dermatitis (692.9) (L30.9)   · Dyslipidemia (272.4) (E78.5)   · Elevated BP without diagnosis of hypertension (796.2) (R03.0)   · Elevated LFTs (790.6) (R79.89)   · Encounter for immunization (V03.89) (Z23)   · Gastroesophageal reflux disease, esophagitis presence not specified (530.81) (K21.9)   · High serum creatine (790.99) (R79.89)   · History of pyelonephritis (V13.02) (Z87.448)   · Hypertension (401.9) (I10)   · 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)   · Internal hemorrhoids (455.0) (K64.8)   · Lateral epicondylitis of right elbow (726.32) (M77.11)   · Overweight with body mass index (BMI) of 26 to 26.9 in adult (278.02,V85.22)  (E66.3,Z68.26)   · Denied: History of Paroxysmal atrial fibrillation   · Peripheral neuropathy (356.9) (G62.9)   · Pyelonephritis, acute (590.10) (N10)   · Recurrent urinary tract infection (599.0) (N39.0)   · Right elbow pain (719.42) (M25.521)   · Right hand paresthesia (782.0) (R20.2)   · Runny nose (784.99) (R09.89)   · Seborrheic  dermatitis (690.10) (L21.9)   · Sore throat (462) (J02.9)   · Stage 3a chronic kidney disease (585.3) (N18.31)   · UTI (urinary tract infection) (599.0) (N39.0)   · Vitamin D deficiency (268.9) (E55.9)   · Weakness (780.79) (R53.1)     Surgical History  Problems    · History of Lung surgery     Past Medical History  Problems    · History of Embolic disease of toe (444.22) (I74.3)   · Resolved Date: 28 Sep 2017   · History of cerebral infarction (V12.54) (Z86.73)   · History of cough   · Resolved Date: 22 Aug 2020   · History of other diseases of the circulatory system, not elsewhere classified (V12.59)  (Z86.79)   · Resolved Date: 28 Sep 2017   · History of urinary tract infection (V13.02) (Z87.440)   · Resolved Date: 22 May 2020   · Denied: History of Paroxysmal atrial fibrillation   · Personal history of coronary atherosclerosis (V12.59) (Z86.79)     Current Meds     Medication Name Instruction   Clobetasol Propionate 0.05 % External Solution APPLY AND GENTLY MASSAGE INTO AFFECTED AREA(S) ONCE DAILY.   D3-50 1.25 MG (22870 UT) Oral Capsule TAKE 1 CAPSULE Weekly sundays   Diclofenac Sodium 1 % External Gel APPLY SPARINGLY TO AFFECTED AREA(S) ONCE DAILY   Ensure Oral Liquid USE AS DIRECTED.   Ezetimibe 10 MG Oral Tablet TAKE 1 TABLET AT BEDTIME.   Furosemide 20 MG Oral Tablet TAKE ONE TABLET BY MOUTH EVERY DAY AS NEEDED for leg swelling and pain   Gabapentin 300 MG Oral Capsule TAKE 1 (ONE) CAPSULE THREE TIMES DAILY, AS NEEDED   Lidocanna 4 % External Patch APPLY 1 PATCH Daily   Loratadine 10 MG Oral Tablet TAKE 1 TABLET DAILY.   Losartan Potassium 100 MG Oral Tablet TAKE ONE TABLET BY MOUTH DAILY   Multivitamin Adults 50+ Oral Tablet TAKE 1 TABLET DAILY.   Pantoprazole Sodium 40 MG Oral Tablet Delayed Release TAKE 1 TABLET DAILY.   Simvastatin 40 MG Oral Tablet TAKE 1 TABLET DAILY.   Xarelto 15 MG Oral Tablet TAKE ONE TABLET BY MOUTH ONCE DAILY      Allergies  Medication    · tramadol   Swelling; 29 Nov 2016;  "Recorded By: Kenzie Phan; 6/29/2020 4:07:33 PM   · Zoloft   Recorded By: Kenzie Phan; 6/29/2020 4:07:33 PM     Family History  Mother    · No pertinent family history  Father    · No pertinent family history     Social History  Problems    · Caffeine use (V49.89) (Z78.9)   ·    · Never smoker   · No alcohol use   · Retired from employment       Overnight Events:    No new developments     Objective Data:  Last Recorded Vitals:  Vitals:    12/09/23 1552 12/09/23 2000 12/10/23 0755 12/10/23 1600   BP:  149/81 129/87 114/65   BP Location:  Right arm Right arm    Patient Position:  Lying Lying    Pulse:  93 77 80   Resp:  16 16 18   Temp:  36.3 °C (97.3 °F) 35.9 °C (96.6 °F) 36.5 °C (97.7 °F)   TempSrc:  Temporal Temporal    SpO2:  98% 98% 96%   Weight: 74 kg (163 lb 2.3 oz)      Height: 1.626 m (5' 4\")          Last Labs:  CBC - 12/10/2023:  7:41 AM  6.7 13.6 213    41.2      CMP - 12/10/2023:  7:41 AM  8.8 7.5 18 --- 0.5   _ 3.8 23 63      PTT - No results in last year.  _   _ _     TROPHS   Date/Time Value Ref Range Status   12/10/2023 07:41 AM 8 0 - 20 ng/L Final   12/09/2023 01:29 PM 8 0 - 20 ng/L Final   12/09/2023 12:09 PM 10 0 - 20 ng/L Final     BNP   Date/Time Value Ref Range Status   11/16/2022 04:30 PM 76 0 - 99 pg/mL Final     Comment:     .  <100 pg/mL - Heart failure unlikely  100-299 pg/mL - Intermediate probability of acute heart  .               failure exacerbation. Correlate with clinical  .               context and patient history.    >=300 pg/mL - Heart Failure likely. Correlate with clinical  .               context and patient history.  BNP testing is performed using different testing   methodology at [facility] than at other   [facility] hospitals. Direct result comparisons should   only be made within the same method.     03/31/2022 05:01 PM 60 0 - 99 pg/mL Final     Comment:     .  <100 pg/mL - Heart failure unlikely  100-299 pg/mL - Intermediate probability of " "acute heart  .               failure exacerbation. Correlate with clinical  .               context and patient history.    >=300 pg/mL - Heart Failure likely. Correlate with clinical  .               context and patient history.  BNP testing is performed using different testing   methodology at [facility] than at other   [facility] hospitals. Direct result comparisons should   only be made within the same method.       VLDL   Date/Time Value Ref Range Status   04/10/2021 08:25 AM 21 0 - 40 mg/dL Final      Last I/O:  I/O last 3 completed shifts:  In: 110 (1.5 mL/kg) [P.O.:110]  Out: - (0 mL/kg)   Weight: 74 kg     Past Cardiology Tests (Last 3 Years):  EKG:  No results found for this or any previous visit from the past 1095 days.    Echo:  No results found for this or any previous visit from the past 1095 days.    Ejection Fractions:  No results found for: \"EF\"  Cath:  No results found for this or any previous visit from the past 1095 days.    Stress Test:  No results found for this or any previous visit from the past 1095 days.    Cardiac Imaging:  No results found for this or any previous visit from the past 1095 days.      Inpatient Medications:  Scheduled medications   Medication Dose Route Frequency    amLODIPine  2.5 mg oral Daily    aspirin  81 mg oral Daily    bisacodyl  10 mg rectal Daily    ezetimibe  10 mg oral Nightly    lidocaine  1 patch transdermal Daily    losartan  100 mg oral Daily    pantoprazole  40 mg oral Daily    perflutren lipid microspheres  0.5-10 mL of dilution intravenous Once in imaging    perflutren protein A microsphere  0.5 mL intravenous Once in imaging    polyethylene glycol  17 g oral Daily    rivaroxaban  15 mg oral Daily    sennosides-docusate sodium  2 tablet oral BID    simvastatin  40 mg oral Nightly     PRN medications   Medication    acetaminophen    Or    acetaminophen    Or    acetaminophen     Continuous Medications   Medication Dose Last Rate       Physical " Exam:  Subjective:   Examination:   General Examination:   General Appearance: Well developed, well nourished, in no acute distress.   Head: normocephalic, atraumatic   Eyes: Anicteric sclera. Pupils are equally round and reactive to light.  Extraocular movements are intact.    Ears: normal   Oral: Cavity: mucosa moist.   Throat: clear.   Neck/Thyroid: neck supple, full range of motion, no cervical lymphadenopathy.   Skin: warm and dry, no suspicious lesions.    Heart: regular rate and rhythm, S1, S2 normal, no murmurs.   Lungs: clear to auscultation bilaterally.   Abdomen: soft, non-tender, non-distended, bowl sound present, normal.   Extremities: no clubbing, no cyanosis, no edema.   Neuro: non-focal, motor strength normal upper lower extremities, sensory exam intact.       Assessment/Plan     R07.9 chest pain over the last 3 to 4 days precipitated by death in family also has some vague flulike symptoms since vaccination his EKG shows chronic controlled A-fib with no ischemic EKG changes he has a high heart score but his troponins are negative he did have 1 remote stent and moderate multivessel disease he may warrant a functional cardiac study in the future  20 5.1 Z95.1 Remote drug-eluting stent as described above to the LAD Promus 2.5 x 2830% ostial circumflex 30% proximal RCA 40% distal EF 50% with mild MR TR  I40 891 what appears to be chronic atrial fibrillation on Xarelto with previous stroke    Code Status:  Full Code    I spent 25 minutes in the professional and overall care of this patient.        Jm Julien MD

## 2023-12-11 NOTE — PROGRESS NOTES
"Aristeo Rome is a 89 y.o. male on day 0 of admission presenting with Chest pain.    Subjective   No overnight events. Family at bedside. Pt reports feeling \"better\". Reports chest pain has resolved. Has mild neck pain. Denies any further b/l arm pain. Tolerating intake.       Objective     Physical Exam    Constitutional: Well developed, awake/alert/oriented x3, no distress, alert and cooperative  HEENT: anicteric sclera, eomi  Cardiovascular: Regular, rate and rhythm, no murmurs, 2+ equal pulses of the extremities, normal S1 and S2, no further chest wall tenderness to palpation  Respiratory/Thorax: Patent airways, CTAB, normal breath sounds with good chest expansion, thorax symmetric, no conversational dyspnea, RA  Gastrointestinal: +BS, Nondistended, soft, non-tender, no rebound tenderness or guarding, no masses palpable, no organomegaly  Musculoskeletal: ROM intact, no joint swelling, normal strength  Extremities: no edema  Skin: warm and dry. No rashes nor lesions noted  Neurological: alert and oriented x3, intact senses, motor, response and reflexes, normal strength, follows commands, clear speech, no facial droop, 5/5 strength    Last Recorded Vitals  Blood pressure 133/82, pulse 80, temperature 37 °C (98.6 °F), temperature source Temporal, resp. rate 18, height 1.626 m (5' 4\"), weight 74 kg (163 lb 2.3 oz), SpO2 98 %.  Intake/Output last 3 Shifts:  I/O last 3 completed shifts:  In: 260 (3.5 mL/kg) [P.O.:260]  Out: - (0 mL/kg)   Weight: 74 kg     Relevant Results  Scheduled medications  amLODIPine, 2.5 mg, oral, Daily  aspirin, 81 mg, oral, Daily  bisacodyl, 10 mg, rectal, Daily  ezetimibe, 10 mg, oral, Nightly  lidocaine, 1 patch, transdermal, Daily  losartan, 100 mg, oral, Daily  pantoprazole, 40 mg, oral, Daily  perflutren lipid microspheres, 0.5-10 mL of dilution, intravenous, Once in imaging  perflutren protein A microsphere, 0.5 mL, intravenous, Once in imaging  polyethylene glycol, 17 g, oral, " Daily  rivaroxaban, 15 mg, oral, Daily  sennosides-docusate sodium, 2 tablet, oral, BID  simvastatin, 40 mg, oral, Nightly      Continuous medications     PRN medications  PRN medications: acetaminophen **OR** acetaminophen **OR** acetaminophen, calcium carbonate  Results from last 7 days   Lab Units 12/11/23  0844 12/10/23  0741 12/09/23  1209   WBC AUTO x10*3/uL 7.0 6.7 7.4   RBC AUTO x10*6/uL 4.62 4.58 4.56   HEMOGLOBIN g/dL 13.7 13.6 13.5     Results from last 7 days   Lab Units 12/11/23  0844 12/10/23  0741 12/09/23  1209   SODIUM mmol/L 135* 136 135*   POTASSIUM mmol/L 4.0 3.7 3.5   CHLORIDE mmol/L 103 102 97*   CO2 mmol/L 27 26 28   BUN mg/dL 33* 37* 56*   CREATININE mg/dL 1.56* 1.66* 2.70*   CALCIUM mg/dL 9.1 8.8 8.8   MAGNESIUM mg/dL 2.27 2.22  --    BILIRUBIN TOTAL mg/dL  --   --  0.5   ALT U/L  --   --  23   AST U/L  --   --  18       ECG 12 lead    Result Date: 12/11/2023  Atrial fibrillation Abnormal ECG When compared with ECG of 02-AUG-2022 21:02, RSR' pattern in V1 is no longer Present       Assessment/Plan   Principal Problem:    Chest pain      Pt is an 89 year old [place] male with PMH of CKD 3, CAD, HTN, paroxysmal A-fib on Xarelto, stroke in 2012 (no residual deficits) presents to the ED with 1 day history of worsening left-sided chest pain. Admitted for further cardiac evaluation.     #Chest pain  #CAD  #HTN  #Constipation  #Afib-on xarelto  #CKD3  #hx of CVA    Unfortunately, pt unable to obtain stress test today due to full stress test schedule. Npo at mid for stress in AM     -pt presents with subacute chest pain that worsened in the past day. Chest pain mostly now resolved. No acute ST changes. Trop negative x2  -given asp in the ED  -requested ED team give sublingual nitroglycerin and assess for any interval response  -monitor on tele  -echo ordered-completed, formal read pending  -cardio consulted, appreciate input-->tentatively planned for further cardiac study on 12/11  -continue home  medications  -continue home xarelto  -added daily bisacodyl for ongoing constipation issues     PIV  On home AC     Dispo:home     CODE STATUS; FULL CODE-confirmed with family                           Yuli Hernandez MD

## 2023-12-11 NOTE — NURSING NOTE
1958 - Pt. Family visiting.  Pt. Up to the BR.   Denies needs at this time.  Pt. Returned to bed with bed alarm on.  Call light within reach.    2027 - Assessment complete.  Meds tolerated well.  Given PRN Tyelonol for c/o neck pain.   Pt. C/o heartburn.  Message sent to Dr. Lawrence regarding new complaint.

## 2023-12-12 ENCOUNTER — APPOINTMENT (OUTPATIENT)
Dept: RADIOLOGY | Facility: HOSPITAL | Age: 88
End: 2023-12-12
Payer: COMMERCIAL

## 2023-12-12 ENCOUNTER — APPOINTMENT (OUTPATIENT)
Dept: CARDIOLOGY | Facility: HOSPITAL | Age: 88
End: 2023-12-12
Payer: COMMERCIAL

## 2023-12-12 VITALS
SYSTOLIC BLOOD PRESSURE: 102 MMHG | BODY MASS INDEX: 27.85 KG/M2 | HEART RATE: 82 BPM | DIASTOLIC BLOOD PRESSURE: 61 MMHG | OXYGEN SATURATION: 96 % | RESPIRATION RATE: 18 BRPM | WEIGHT: 163.14 LBS | TEMPERATURE: 96.8 F | HEIGHT: 64 IN

## 2023-12-12 LAB
ANION GAP SERPL CALC-SCNC: 12 MMOL/L (ref 10–20)
AORTIC VALVE PEAK VELOCITY: 1.21
AV PEAK GRADIENT: 5.9
AVA (PEAK VEL): 2.08
BUN SERPL-MCNC: 29 MG/DL (ref 6–23)
CALCIUM SERPL-MCNC: 8.8 MG/DL (ref 8.6–10.3)
CHLORIDE SERPL-SCNC: 105 MMOL/L (ref 98–107)
CO2 SERPL-SCNC: 23 MMOL/L (ref 21–32)
CREAT SERPL-MCNC: 1.61 MG/DL (ref 0.5–1.3)
EJECTION FRACTION APICAL 4 CHAMBER: 63.5
EJECTION FRACTION: 60
ERYTHROCYTE [DISTWIDTH] IN BLOOD BY AUTOMATED COUNT: 12.5 % (ref 11.5–14.5)
GFR SERPL CREATININE-BSD FRML MDRD: 41 ML/MIN/1.73M*2
GLUCOSE SERPL-MCNC: 106 MG/DL (ref 74–99)
HCT VFR BLD AUTO: 40.2 % (ref 41–52)
HGB BLD-MCNC: 13.2 G/DL (ref 13.5–17.5)
LEFT VENTRICLE INTERNAL DIMENSION DIASTOLE: 3.63 (ref 3.5–6)
LEFT VENTRICULAR OUTFLOW TRACT DIAMETER: 2
MAGNESIUM SERPL-MCNC: 2.3 MG/DL (ref 1.6–2.4)
MCH RBC QN AUTO: 29.9 PG (ref 26–34)
MCHC RBC AUTO-ENTMCNC: 32.8 G/DL (ref 32–36)
MCV RBC AUTO: 91 FL (ref 80–100)
MITRAL VALVE E/E' RATIO: 12.34
NRBC BLD-RTO: 0 /100 WBCS (ref 0–0)
PLATELET # BLD AUTO: 194 X10*3/UL (ref 150–450)
POTASSIUM SERPL-SCNC: 4.5 MMOL/L (ref 3.5–5.3)
RBC # BLD AUTO: 4.41 X10*6/UL (ref 4.5–5.9)
RIGHT VENTRICLE PEAK SYSTOLIC PRESSURE: 22.9
SODIUM SERPL-SCNC: 135 MMOL/L (ref 136–145)
TRICUSPID ANNULAR PLANE SYSTOLIC EXCURSION: 1.2
WBC # BLD AUTO: 5.8 X10*3/UL (ref 4.4–11.3)

## 2023-12-12 PROCEDURE — 93018 CV STRESS TEST I&R ONLY: CPT | Performed by: INTERNAL MEDICINE

## 2023-12-12 PROCEDURE — 2500000004 HC RX 250 GENERAL PHARMACY W/ HCPCS (ALT 636 FOR OP/ED): Mod: MUE | Performed by: STUDENT IN AN ORGANIZED HEALTH CARE EDUCATION/TRAINING PROGRAM

## 2023-12-12 PROCEDURE — 93016 CV STRESS TEST SUPVJ ONLY: CPT | Performed by: INTERNAL MEDICINE

## 2023-12-12 PROCEDURE — A9502 TC99M TETROFOSMIN: HCPCS | Performed by: STUDENT IN AN ORGANIZED HEALTH CARE EDUCATION/TRAINING PROGRAM

## 2023-12-12 PROCEDURE — 78452 HT MUSCLE IMAGE SPECT MULT: CPT

## 2023-12-12 PROCEDURE — 93017 CV STRESS TEST TRACING ONLY: CPT

## 2023-12-12 PROCEDURE — G0378 HOSPITAL OBSERVATION PER HR: HCPCS

## 2023-12-12 PROCEDURE — 85027 COMPLETE CBC AUTOMATED: CPT | Performed by: STUDENT IN AN ORGANIZED HEALTH CARE EDUCATION/TRAINING PROGRAM

## 2023-12-12 PROCEDURE — 80048 BASIC METABOLIC PNL TOTAL CA: CPT | Performed by: STUDENT IN AN ORGANIZED HEALTH CARE EDUCATION/TRAINING PROGRAM

## 2023-12-12 PROCEDURE — 83735 ASSAY OF MAGNESIUM: CPT | Performed by: STUDENT IN AN ORGANIZED HEALTH CARE EDUCATION/TRAINING PROGRAM

## 2023-12-12 PROCEDURE — 36415 COLL VENOUS BLD VENIPUNCTURE: CPT | Performed by: STUDENT IN AN ORGANIZED HEALTH CARE EDUCATION/TRAINING PROGRAM

## 2023-12-12 PROCEDURE — 3430000001 HC RX 343 DIAGNOSTIC RADIOPHARMACEUTICALS: Performed by: STUDENT IN AN ORGANIZED HEALTH CARE EDUCATION/TRAINING PROGRAM

## 2023-12-12 PROCEDURE — 78452 HT MUSCLE IMAGE SPECT MULT: CPT | Performed by: INTERNAL MEDICINE

## 2023-12-12 PROCEDURE — 99239 HOSP IP/OBS DSCHRG MGMT >30: CPT | Performed by: STUDENT IN AN ORGANIZED HEALTH CARE EDUCATION/TRAINING PROGRAM

## 2023-12-12 PROCEDURE — 2500000001 HC RX 250 WO HCPCS SELF ADMINISTERED DRUGS (ALT 637 FOR MEDICARE OP): Performed by: STUDENT IN AN ORGANIZED HEALTH CARE EDUCATION/TRAINING PROGRAM

## 2023-12-12 PROCEDURE — 2500000005 HC RX 250 GENERAL PHARMACY W/O HCPCS: Performed by: INTERNAL MEDICINE

## 2023-12-12 RX ORDER — AMOXICILLIN 250 MG
2 CAPSULE ORAL 2 TIMES DAILY
Qty: 120 TABLET | Refills: 0 | Status: SHIPPED | OUTPATIENT
Start: 2023-12-12 | End: 2024-01-11

## 2023-12-12 RX ADMIN — PANTOPRAZOLE SODIUM 40 MG: 40 TABLET, DELAYED RELEASE ORAL at 10:42

## 2023-12-12 RX ADMIN — RIVAROXABAN 15 MG: 15 TABLET, FILM COATED ORAL at 10:42

## 2023-12-12 RX ADMIN — AMLODIPINE BESYLATE 2.5 MG: 2.5 TABLET ORAL at 10:43

## 2023-12-12 RX ADMIN — ASPIRIN 81 MG: 81 TABLET, COATED ORAL at 10:42

## 2023-12-12 RX ADMIN — LOSARTAN POTASSIUM 100 MG: 100 TABLET, FILM COATED ORAL at 10:42

## 2023-12-12 RX ADMIN — TETROFOSMIN 36 MILLICURIE: 0.23 INJECTION, POWDER, LYOPHILIZED, FOR SOLUTION INTRAVENOUS at 08:45

## 2023-12-12 RX ADMIN — SENNOSIDES AND DOCUSATE SODIUM 2 TABLET: 8.6; 5 TABLET ORAL at 10:42

## 2023-12-12 RX ADMIN — TETROFOSMIN 11.2 MILLICURIE: 0.23 INJECTION, POWDER, LYOPHILIZED, FOR SOLUTION INTRAVENOUS at 07:01

## 2023-12-12 RX ADMIN — LIDOCAINE 1 PATCH: 4 PATCH TOPICAL at 10:43

## 2023-12-12 ASSESSMENT — PAIN SCALES - WONG BAKER: WONGBAKER_NUMERICALRESPONSE: NO HURT

## 2023-12-12 ASSESSMENT — PAIN SCALES - GENERAL: PAINLEVEL_OUTOF10: 0 - NO PAIN

## 2023-12-12 NOTE — NURSING NOTE
Discharge orders gone over with patient. Verbal understanding given of orders and follow ups. No complaint of chest pain and vital signs are stable at this time. Dc home with spouse.

## 2023-12-12 NOTE — DISCHARGE SUMMARY
"Discharge Diagnosis  Chest pain    Issues Requiring Follow-Up  Follow up pcp and cardio    Discharge Meds     Your medication list        START taking these medications        Instructions Last Dose Given Next Dose Due   sennosides-docusate sodium 8.6-50 mg tablet  Commonly known as: Donya-Colace      Take 2 tablets by mouth 2 times a day.              CHANGE how you take these medications        Instructions Last Dose Given Next Dose Due   furosemide 20 mg tablet  Commonly known as: Lasix  What changed: when to take this      TAKE ONE TABLET BY MOUTH DAILY AS NEEDED       simvastatin 40 mg tablet  Commonly known as: Zocor  What changed: when to take this      TAKE ONE TABLET BY MOUTH EVERY DAY              CONTINUE taking these medications        Instructions Last Dose Given Next Dose Due   amLODIPine 2.5 mg tablet  Commonly known as: Norvasc      TAKE ONE TABLET BY MOUTH EVERY DAY       aspirin 81 mg EC tablet           Ensure Active Muscle Health liquid  Generic drug: nutritional drink      Take 414 mL by mouth once daily.       losartan 100 mg tablet  Commonly known as: Cozaar      Take 1 tablet (100 mg) by mouth once daily.       pantoprazole 40 mg EC tablet  Commonly known as: ProtoNix      TAKE ONE TABLET BY MOUTH DAILY. DO NOT CRUSH, CHEW OR SPLIT       Xarelto 15 mg tablet  Generic drug: rivaroxaban      TAKE ONE TABLET BY MOUTH once DAILY              STOP taking these medications      diclofenac sodium 1 % gel gel  Commonly known as: Voltaren        ezetimibe 10 mg tablet  Commonly known as: Zetia        lidocaine 5 % patch  Commonly known as: Lidoderm                  Where to Get Your Medications        These medications were sent to [id number] [place], [place] - [address] [id number]      Phone: [phone]   sennosides-docusate sodium 8.6-50 mg tablet         Test Results Pending At Discharge  Pending Labs       No current pending labs.            HPI:  \"Chrim " "Wild is a 89 y.o. male with PMH of CKD 3, CAD, HTN, paroxysmal A-fib on Xarelto, stroke in 2012 (no residual deficits) presents to the ED with 1 day history of worsening left-sided chest pain.  Patient is [place] and formal  services were requested however due to technical issues, family provided translation services.  Patient reports that he has had intermittent left side pressure-like chest pain that has been occurring intermittently greater than 2 weeks.  Reports pain is usually nonexertional, left-sided, pressure-like, 8 out of 10 at worst, sometimes with radiation to both arms.  Family reports that patient is very anxious and there was a recent death in the family yesterday and family feels this is more a culprit to the presentation as they report he gets very anxious and easily stressed at times.  Patient reports chest pain has mostly resolved at time of interview, described it as a dull ache.  He also reports longstanding issues with constipation for many years.  Did have a small bowel movement today but previously had no bowel movement for 3-4 days.  States this is typical for him.  He denied headache, dizziness, shortness of breath, abdominal pain, nausea or vomiting.  Family does report that for years patient has had very poor appetite even to his native [place] food.  He does drink Ensure at home.  Family is concerned he is becoming dehydrated.  Family reports his weight is stable.     In the ED, vitals stable.  Troponin negative.  EKG without acute ST changes.  Patient given a dose of aspirin and admitted for further cardiac workup.\"    Hospital course  Patient was monitored on telemetry.  Troponins negative.  Cardiology consulted and patient underwent both echo and stress testing.  Echo showed EF of 60 to 65% without valvular abnormalities.  Stress test negative.  Chest pain resolved without intervention.  Cleared for discharge home with PCP follow-up.      Pertinent Physical Exam At Time " of Discharge  Physical Exam  Constitutional: Well developed, awake/alert/oriented x3, no distress, alert and cooperative  HEENT: anicteric sclera, eomi, no oral lesions noted  Cardiovascular: Regular, rate and rhythm, no murmurs, 2+ equal pulses of the extremities, normal S1 and S2  Respiratory/Thorax: Patent airways, CTAB, normal breath sounds with good chest expansion, thorax symmetric, no conversational dyspnea, RA  Gastrointestinal: +BS, Nondistended, soft, non-tender, no rebound tenderness or guarding, no masses palpable, no organomegaly  Musculoskeletal: ROM intact, no joint swelling, normal strength  Extremities: no edema  Skin: warm and dry. No rashes nor lesions noted  Neurological: alert and oriented x3, intact senses, follows commands, clear speech, no facial droop, 5/5 strength    Outpatient Follow-Up  Future Appointments   Date Time Provider Department Center   1/15/2024  3:45 PM Marin Bland DO [id number] [place]         Yuli Hernandez MD

## 2023-12-13 ENCOUNTER — PATIENT OUTREACH (OUTPATIENT)
Dept: PRIMARY CARE | Facility: CLINIC | Age: 88
End: 2023-12-13
Payer: COMMERCIAL

## 2023-12-13 NOTE — PROGRESS NOTES
Discharge Facility: [facility]  Discharge Diagnosis: chest pain  Admission Date: 12/9/2023  Discharge Date: 12/12/2023    PCP Appointment Date: none  Specialist Appointment Date: ortho 1/25/2024  Hospital Encounter and Summary: Linked     START taking:  sennosides-docusate sodium   CHANGE how you take:  furosemide   STOP taking:  diclofenac sodium 1 % gel gel (Voltaren)  ezetimibe 10 mg tablet   lidocaine 5 % patch     Unsuccessful attempts x 2 to reach patient for PCP follow up.

## 2023-12-14 LAB
ATRIAL RATE: 90 BPM
Q ONSET: 224 MS
QRS COUNT: 15 BEATS
QRS DURATION: 88 MS
QT INTERVAL: 340 MS
QTC CALCULATION(BAZETT): 425 MS
QTC FREDERICIA: 394 MS
R AXIS: 12 DEGREES
T AXIS: 25 DEGREES
T OFFSET: 394 MS
VENTRICULAR RATE: 94 BPM

## 2023-12-27 ENCOUNTER — PATIENT OUTREACH (OUTPATIENT)
Dept: PRIMARY CARE | Facility: CLINIC | Age: 88
End: 2023-12-27
Payer: COMMERCIAL

## 2024-01-04 DIAGNOSIS — E78.2 MIXED HYPERLIPIDEMIA: ICD-10-CM

## 2024-01-08 RX ORDER — SIMVASTATIN 40 MG/1
40 TABLET, FILM COATED ORAL DAILY
Qty: 30 TABLET | Refills: 2 | Status: SHIPPED | OUTPATIENT
Start: 2024-01-08 | End: 2024-03-07 | Stop reason: SDUPTHER

## 2024-01-13 DIAGNOSIS — I10 HYPERTENSION, UNSPECIFIED TYPE: ICD-10-CM

## 2024-01-15 ENCOUNTER — APPOINTMENT (OUTPATIENT)
Dept: ORTHOPEDIC SURGERY | Facility: CLINIC | Age: 89
End: 2024-01-15
Payer: COMMERCIAL

## 2024-01-15 RX ORDER — LOSARTAN POTASSIUM 100 MG/1
TABLET ORAL
Qty: 90 TABLET | Refills: 0 | Status: SHIPPED | OUTPATIENT
Start: 2024-01-15 | End: 2024-04-29

## 2024-01-26 ENCOUNTER — PATIENT OUTREACH (OUTPATIENT)
Dept: PRIMARY CARE | Facility: CLINIC | Age: 89
End: 2024-01-26
Payer: COMMERCIAL

## 2024-02-06 DIAGNOSIS — K21.9 GASTROESOPHAGEAL REFLUX DISEASE WITHOUT ESOPHAGITIS: ICD-10-CM

## 2024-02-07 RX ORDER — PANTOPRAZOLE SODIUM 40 MG/1
40 TABLET, DELAYED RELEASE ORAL DAILY
Qty: 30 TABLET | Refills: 0 | Status: SHIPPED | OUTPATIENT
Start: 2024-02-07 | End: 2024-03-20

## 2024-03-07 DIAGNOSIS — E78.2 MIXED HYPERLIPIDEMIA: ICD-10-CM

## 2024-03-07 RX ORDER — SIMVASTATIN 40 MG/1
40 TABLET, FILM COATED ORAL DAILY
Qty: 100 TABLET | Refills: 1 | Status: SHIPPED | OUTPATIENT
Start: 2024-03-07

## 2024-03-19 DIAGNOSIS — K21.9 GASTROESOPHAGEAL REFLUX DISEASE WITHOUT ESOPHAGITIS: ICD-10-CM

## 2024-03-20 RX ORDER — PANTOPRAZOLE SODIUM 40 MG/1
40 TABLET, DELAYED RELEASE ORAL DAILY
Qty: 90 TABLET | Refills: 1 | Status: SHIPPED | OUTPATIENT
Start: 2024-03-20

## 2024-03-25 DIAGNOSIS — N18.31 STAGE 3A CHRONIC KIDNEY DISEASE (MULTI): ICD-10-CM

## 2024-03-25 RX ORDER — LACTOSE-REDUCED FOOD
LIQUID (ML) ORAL
Qty: 5688 ML | Refills: 0 | Status: SHIPPED | OUTPATIENT
Start: 2024-03-25 | End: 2024-05-01

## 2024-04-25 DIAGNOSIS — I10 HYPERTENSION, UNSPECIFIED TYPE: ICD-10-CM

## 2024-04-29 RX ORDER — LOSARTAN POTASSIUM 100 MG/1
100 TABLET ORAL DAILY
Qty: 30 TABLET | Refills: 0 | Status: SHIPPED | OUTPATIENT
Start: 2024-04-29 | End: 2024-06-06

## 2024-04-30 DIAGNOSIS — N18.31 STAGE 3A CHRONIC KIDNEY DISEASE (MULTI): ICD-10-CM

## 2024-05-01 RX ORDER — LACTOSE-REDUCED FOOD
LIQUID (ML) ORAL
Qty: 5668 ML | Refills: 1 | Status: SHIPPED | OUTPATIENT
Start: 2024-05-01

## 2024-06-05 DIAGNOSIS — I10 HYPERTENSION, UNSPECIFIED TYPE: ICD-10-CM

## 2024-06-06 RX ORDER — LOSARTAN POTASSIUM 100 MG/1
100 TABLET ORAL DAILY
Qty: 30 TABLET | Refills: 0 | Status: SHIPPED | OUTPATIENT
Start: 2024-06-06

## 2024-06-06 NOTE — TELEPHONE ENCOUNTER
Will offer refill for one month, patient needing follow up with PCP and cardiology, has need been since since his hospitalization in December

## 2024-06-19 DIAGNOSIS — N18.31 STAGE 3A CHRONIC KIDNEY DISEASE (MULTI): ICD-10-CM

## 2024-06-20 RX ORDER — LACTOSE-REDUCED FOOD
LIQUID (ML) ORAL
Qty: 5688 ML | Refills: 2 | Status: SHIPPED | OUTPATIENT
Start: 2024-06-20

## 2024-07-01 DIAGNOSIS — I10 HYPERTENSION, UNSPECIFIED TYPE: ICD-10-CM

## 2024-07-03 RX ORDER — LOSARTAN POTASSIUM 100 MG/1
100 TABLET ORAL DAILY
Qty: 30 TABLET | Refills: 0 | OUTPATIENT
Start: 2024-07-03

## 2024-07-17 DIAGNOSIS — I10 HYPERTENSION, UNSPECIFIED TYPE: ICD-10-CM

## 2024-07-17 RX ORDER — LOSARTAN POTASSIUM 100 MG/1
100 TABLET ORAL DAILY
Qty: 30 TABLET | Refills: 0 | OUTPATIENT
Start: 2024-07-17

## 2024-08-01 DIAGNOSIS — I10 HYPERTENSION, UNSPECIFIED TYPE: ICD-10-CM

## 2024-08-01 RX ORDER — LOSARTAN POTASSIUM 100 MG/1
100 TABLET ORAL DAILY
Qty: 30 TABLET | Refills: 0 | Status: SHIPPED | OUTPATIENT
Start: 2024-08-01

## 2024-09-04 DIAGNOSIS — N18.31 STAGE 3A CHRONIC KIDNEY DISEASE (MULTI): ICD-10-CM

## 2024-09-05 DIAGNOSIS — I10 HYPERTENSION, UNSPECIFIED TYPE: ICD-10-CM

## 2024-09-05 RX ORDER — LOSARTAN POTASSIUM 100 MG/1
100 TABLET ORAL DAILY
Qty: 30 TABLET | Refills: 0 | Status: SHIPPED | OUTPATIENT
Start: 2024-09-05

## 2024-09-09 RX ORDER — LACTOSE-REDUCED FOOD
LIQUID (ML) ORAL
Qty: 5688 ML | Refills: 0 | Status: SHIPPED | OUTPATIENT
Start: 2024-09-09

## 2024-10-02 DIAGNOSIS — N18.31 STAGE 3A CHRONIC KIDNEY DISEASE (MULTI): ICD-10-CM

## 2024-10-03 RX ORDER — LACTOSE-REDUCED FOOD
LIQUID (ML) ORAL
Qty: 5688 ML | Refills: 0 | Status: SHIPPED | OUTPATIENT
Start: 2024-10-03

## 2024-10-10 DIAGNOSIS — K21.9 GASTROESOPHAGEAL REFLUX DISEASE WITHOUT ESOPHAGITIS: ICD-10-CM

## 2024-10-10 DIAGNOSIS — I10 HYPERTENSION, UNSPECIFIED TYPE: ICD-10-CM

## 2024-10-10 DIAGNOSIS — E78.2 MIXED HYPERLIPIDEMIA: ICD-10-CM

## 2024-10-10 RX ORDER — LOSARTAN POTASSIUM 100 MG/1
100 TABLET ORAL DAILY
Qty: 30 TABLET | Refills: 0 | Status: SHIPPED | OUTPATIENT
Start: 2024-10-10

## 2024-10-10 RX ORDER — SIMVASTATIN 40 MG/1
40 TABLET, FILM COATED ORAL DAILY
Qty: 100 TABLET | Refills: 0 | Status: SHIPPED | OUTPATIENT
Start: 2024-10-10

## 2024-10-11 RX ORDER — PANTOPRAZOLE SODIUM 40 MG/1
40 TABLET, DELAYED RELEASE ORAL DAILY
Qty: 90 TABLET | Refills: 0 | Status: SHIPPED | OUTPATIENT
Start: 2024-10-11

## 2024-10-26 DIAGNOSIS — I48.20 CHRONIC A-FIB (MULTI): ICD-10-CM

## 2024-11-06 DIAGNOSIS — I10 HYPERTENSION, UNSPECIFIED TYPE: ICD-10-CM

## 2024-11-06 RX ORDER — AMLODIPINE BESYLATE 2.5 MG/1
2.5 TABLET ORAL DAILY
Qty: 90 TABLET | Refills: 3 | Status: SHIPPED | OUTPATIENT
Start: 2024-11-06 | End: 2025-11-06

## 2024-11-10 DIAGNOSIS — N18.31 STAGE 3A CHRONIC KIDNEY DISEASE (MULTI): ICD-10-CM

## 2024-11-11 RX ORDER — LACTOSE-REDUCED FOOD
LIQUID (ML) ORAL
Qty: 5688 ML | Refills: 0 | Status: SHIPPED | OUTPATIENT
Start: 2024-11-11

## 2024-11-21 DIAGNOSIS — I10 HYPERTENSION, UNSPECIFIED TYPE: ICD-10-CM

## 2024-11-22 RX ORDER — LOSARTAN POTASSIUM 100 MG/1
100 TABLET ORAL DAILY
Qty: 30 TABLET | Refills: 0 | Status: SHIPPED | OUTPATIENT
Start: 2024-11-22

## 2024-12-20 DIAGNOSIS — I10 HYPERTENSION, UNSPECIFIED TYPE: ICD-10-CM

## 2024-12-20 RX ORDER — LOSARTAN POTASSIUM 100 MG/1
100 TABLET ORAL DAILY
Qty: 30 TABLET | Refills: 0 | Status: SHIPPED | OUTPATIENT
Start: 2024-12-20

## 2024-12-29 DIAGNOSIS — K21.9 GASTROESOPHAGEAL REFLUX DISEASE WITHOUT ESOPHAGITIS: ICD-10-CM

## 2024-12-29 DIAGNOSIS — I10 HYPERTENSION, UNSPECIFIED TYPE: ICD-10-CM

## 2024-12-30 RX ORDER — LOSARTAN POTASSIUM 100 MG/1
100 TABLET ORAL DAILY
Qty: 30 TABLET | Refills: 0 | Status: SHIPPED | OUTPATIENT
Start: 2024-12-30

## 2024-12-30 RX ORDER — PANTOPRAZOLE SODIUM 40 MG/1
40 TABLET, DELAYED RELEASE ORAL DAILY
Qty: 90 TABLET | Refills: 0 | Status: SHIPPED | OUTPATIENT
Start: 2024-12-30

## 2025-01-02 DIAGNOSIS — N18.31 STAGE 3A CHRONIC KIDNEY DISEASE (MULTI): ICD-10-CM

## 2025-01-02 RX ORDER — LACTOSE-REDUCED FOOD
LIQUID (ML) ORAL
Qty: 5688 ML | Refills: 2 | Status: SHIPPED | OUTPATIENT
Start: 2025-01-02

## 2025-02-11 ENCOUNTER — APPOINTMENT (OUTPATIENT)
Dept: PRIMARY CARE | Facility: CLINIC | Age: OVER 89
End: 2025-02-11
Payer: COMMERCIAL

## 2025-02-11 VITALS
OXYGEN SATURATION: 99 % | HEIGHT: 64 IN | HEART RATE: 76 BPM | DIASTOLIC BLOOD PRESSURE: 76 MMHG | RESPIRATION RATE: 16 BRPM | WEIGHT: 162 LBS | BODY MASS INDEX: 27.66 KG/M2 | TEMPERATURE: 98.3 F | SYSTOLIC BLOOD PRESSURE: 122 MMHG

## 2025-02-11 DIAGNOSIS — I48.20 CHRONIC A-FIB (MULTI): ICD-10-CM

## 2025-02-11 DIAGNOSIS — M25.521 ELBOW PAIN, CHRONIC, RIGHT: ICD-10-CM

## 2025-02-11 DIAGNOSIS — E78.2 MIXED HYPERLIPIDEMIA: ICD-10-CM

## 2025-02-11 DIAGNOSIS — G89.29 ELBOW PAIN, CHRONIC, RIGHT: ICD-10-CM

## 2025-02-11 DIAGNOSIS — G62.9 NEUROPATHY: ICD-10-CM

## 2025-02-11 DIAGNOSIS — K21.9 GASTROESOPHAGEAL REFLUX DISEASE WITHOUT ESOPHAGITIS: ICD-10-CM

## 2025-02-11 DIAGNOSIS — Z23 NEED FOR VACCINATION: ICD-10-CM

## 2025-02-11 DIAGNOSIS — I10 HYPERTENSION, UNSPECIFIED TYPE: ICD-10-CM

## 2025-02-11 DIAGNOSIS — Z13.220 LIPID SCREENING: ICD-10-CM

## 2025-02-11 DIAGNOSIS — I48.19 PERSISTENT ATRIAL FIBRILLATION (MULTI): ICD-10-CM

## 2025-02-11 DIAGNOSIS — Z00.00 ROUTINE GENERAL MEDICAL EXAMINATION AT HEALTH CARE FACILITY: Primary | ICD-10-CM

## 2025-02-11 DIAGNOSIS — N18.31 STAGE 3A CHRONIC KIDNEY DISEASE (MULTI): ICD-10-CM

## 2025-02-11 PROCEDURE — G0439 PPPS, SUBSEQ VISIT: HCPCS | Performed by: FAMILY MEDICINE

## 2025-02-11 PROCEDURE — 1157F ADVNC CARE PLAN IN RCRD: CPT | Performed by: FAMILY MEDICINE

## 2025-02-11 PROCEDURE — 3074F SYST BP LT 130 MM HG: CPT | Performed by: FAMILY MEDICINE

## 2025-02-11 PROCEDURE — 1159F MED LIST DOCD IN RCRD: CPT | Performed by: FAMILY MEDICINE

## 2025-02-11 PROCEDURE — 3078F DIAST BP <80 MM HG: CPT | Performed by: FAMILY MEDICINE

## 2025-02-11 PROCEDURE — 90662 IIV NO PRSV INCREASED AG IM: CPT | Performed by: FAMILY MEDICINE

## 2025-02-11 PROCEDURE — 1036F TOBACCO NON-USER: CPT | Performed by: FAMILY MEDICINE

## 2025-02-11 PROCEDURE — G0008 ADMIN INFLUENZA VIRUS VAC: HCPCS | Performed by: FAMILY MEDICINE

## 2025-02-11 PROCEDURE — 1123F ACP DISCUSS/DSCN MKR DOCD: CPT | Performed by: FAMILY MEDICINE

## 2025-02-11 PROCEDURE — 1158F ADVNC CARE PLAN TLK DOCD: CPT | Performed by: FAMILY MEDICINE

## 2025-02-11 RX ORDER — FUROSEMIDE 20 MG/1
20 TABLET ORAL DAILY PRN
Qty: 30 TABLET | Refills: 2 | Status: SHIPPED | OUTPATIENT
Start: 2025-02-11

## 2025-02-11 RX ORDER — PANTOPRAZOLE SODIUM 40 MG/1
40 TABLET, DELAYED RELEASE ORAL DAILY
Qty: 90 TABLET | Refills: 0 | Status: SHIPPED | OUTPATIENT
Start: 2025-02-11

## 2025-02-11 RX ORDER — AMLODIPINE BESYLATE 2.5 MG/1
2.5 TABLET ORAL DAILY
Qty: 90 TABLET | Refills: 3 | Status: SHIPPED | OUTPATIENT
Start: 2025-02-11 | End: 2026-02-11

## 2025-02-11 RX ORDER — LOSARTAN POTASSIUM 100 MG/1
100 TABLET ORAL DAILY
Qty: 30 TABLET | Refills: 0 | Status: SHIPPED | OUTPATIENT
Start: 2025-02-11

## 2025-02-11 RX ORDER — SIMVASTATIN 40 MG/1
40 TABLET, FILM COATED ORAL DAILY
Qty: 100 TABLET | Refills: 0 | Status: SHIPPED | OUTPATIENT
Start: 2025-02-11

## 2025-02-11 ASSESSMENT — PATIENT HEALTH QUESTIONNAIRE - PHQ9
SUM OF ALL RESPONSES TO PHQ9 QUESTIONS 1 AND 2: 1
1. LITTLE INTEREST OR PLEASURE IN DOING THINGS: NOT AT ALL
10. IF YOU CHECKED OFF ANY PROBLEMS, HOW DIFFICULT HAVE THESE PROBLEMS MADE IT FOR YOU TO DO YOUR WORK, TAKE CARE OF THINGS AT HOME, OR GET ALONG WITH OTHER PEOPLE: SOMEWHAT DIFFICULT
2. FEELING DOWN, DEPRESSED OR HOPELESS: SEVERAL DAYS

## 2025-02-11 ASSESSMENT — ENCOUNTER SYMPTOMS
JOINT SWELLING: 1
DIARRHEA: 0
COUGH: 0
ARTHRALGIAS: 1
MYALGIAS: 1
WHEEZING: 0
DEPRESSION: 1
ABDOMINAL PAIN: 0
DIZZINESS: 0
FEVER: 0
CONSTIPATION: 0
OCCASIONAL FEELINGS OF UNSTEADINESS: 1
WEAKNESS: 0
SHORTNESS OF BREATH: 0
NUMBNESS: 0
LOSS OF SENSATION IN FEET: 0
VOMITING: 0
HEADACHES: 0

## 2025-02-11 NOTE — PROGRESS NOTES
Overall patient is doing okay.  He still going to [place].  Obviously he is not as active.  He still has a right or right elbow pain.  No neck pain.  The gabapentin helps with that.  He send no chest pain or short breath.  Normal bowel bladder.  At times he may be constipated but does take a laxative.  He has been doing the boost.  Appetite may be decreased.  But he is trying.  Nan Rome is a 89 y.o. male who is here for a routine exam.  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    Other conditions influencing health status 08/22/2020    History of cough    Personal history of other diseases of the circulatory system 04/18/2014    Personal history of coronary atherosclerosis    Personal history of other diseases of the circulatory system 09/25/2015    History of other diseases of the circulatory system, not elsewhere classified    Personal history of transient ischemic attack (TIA), and cerebral infarction without residual deficits 04/18/2014    History of cerebral infarction    Personal history of urinary (tract) infections 05/22/2020    History of urinary tract infection    Pyelonephritis, acute 11/13/2023    Seborrheic dermatitis 11/13/2023     Past Surgical History:   Procedure Laterality Date    OTHER SURGICAL HISTORY  12/16/2020    Lung surgery     Social History     Social History Narrative    Not on file         Allergies and Medications  Sertraline and Tramadol  Current Outpatient Medications on File Prior to Visit   Medication Sig Dispense Refill    amLODIPine (Norvasc) 2.5 mg tablet Take 1 tablet (2.5 mg) by mouth once daily. 90 tablet 3    aspirin 81 mg EC tablet Take 1 tablet (81 mg) by mouth once daily.      furosemide (Lasix) 20 mg  "tablet TAKE ONE TABLET BY MOUTH DAILY AS NEEDED 30 tablet 11    losartan (Cozaar) 100 mg tablet TAKE 1 TABLET BY MOUTH EVERY DAY. 30 tablet 0    nutritional drink (Ensure) liquid TAKE 414 ML BY MOUTH ONCE DAILY. 5688 mL 2    pantoprazole (ProtoNix) 40 mg EC tablet TAKE ONE TABLET BY MOUTH DAILY. DO NOT CRUSH, CHEW, OR SPLIT 90 tablet 0    rivaroxaban (Xarelto) 15 mg tablet Take 1 tablet (15 mg) by mouth once daily. 90 tablet 3    simvastatin (Zocor) 40 mg tablet TAKE 1 TABLET BY MOUTH once DAILY 100 tablet 0     No current facility-administered medications on file prior to visit.       New Concerns:    Lifestyle  Diet:  Healthy  Physical Activity: Not on file      Tobacco Use: Low Risk  (2/11/2025)    Patient History     Smoking Tobacco Use: Never     Smokeless Tobacco Use: Never     Passive Exposure: Not on file      Alcohol Use: Not At Risk (12/9/2023)    AUDIT-C     Frequency of Alcohol Consumption: Never     Average Number of Drinks: Patient does not drink     Frequency of Binge Drinking: Never      Depression: Not at risk (2/11/2025)    PHQ-2     PHQ-2 Score: 1      Stress: Not on file       Consultants:          Review of Systems   Constitutional:  Negative for fever.   Respiratory:  Negative for cough, shortness of breath and wheezing.    Cardiovascular:  Negative for chest pain and leg swelling.   Gastrointestinal:  Negative for abdominal pain, constipation, diarrhea and vomiting.   Musculoskeletal:  Positive for arthralgias, gait problem, joint swelling and myalgias.   Skin:  Negative for rash.   Neurological:  Negative for dizziness, weakness, numbness and headaches.       Objective   /76 (BP Location: Left arm, Patient Position: Sitting, BP Cuff Size: Adult)   Pulse 76   Temp 36.8 °C (98.3 °F)   Resp 16   Ht 1.626 m (5' 4\")   Wt 73.5 kg (162 lb)   SpO2 99%   BMI 27.81 kg/m²     Physical Exam  Vitals and nursing note reviewed.   Constitutional:       General: He is not in acute distress.     " Appearance: Normal appearance. He is not ill-appearing.   HENT:      Head: Normocephalic.      Right Ear: Tympanic membrane and ear canal normal.      Left Ear: Tympanic membrane and ear canal normal.      Nose: Nose normal.      Mouth/Throat:      Mouth: Mucous membranes are moist.   Eyes:      Extraocular Movements: Extraocular movements intact.      Conjunctiva/sclera: Conjunctivae normal.      Pupils: Pupils are equal, round, and reactive to light.   Cardiovascular:      Rate and Rhythm: Normal rate and regular rhythm.      Pulses: Normal pulses.   Pulmonary:      Effort: Pulmonary effort is normal. No respiratory distress.      Breath sounds: No wheezing, rhonchi or rales.   Abdominal:      General: Bowel sounds are normal.      Palpations: Abdomen is soft.      Tenderness: There is no guarding.      Hernia: No hernia is present.   Musculoskeletal:         General: Swelling and tenderness present. Normal range of motion.      Cervical back: Neck supple.      Right lower leg: No edema.      Left lower leg: No edema.      Comments: Right elbow chronically slightly swollen, arthritic.   Skin:     General: Skin is warm.      Capillary Refill: Capillary refill takes less than 2 seconds.   Neurological:      General: No focal deficit present.      Mental Status: He is oriented to person, place, and time.      Cranial Nerves: No cranial nerve deficit.      Sensory: No sensory deficit.      Gait: Gait normal.      Deep Tendon Reflexes: Reflexes normal.   Psychiatric:         Mood and Affect: Mood normal.         Behavior: Behavior normal.         Judgment: Judgment normal.         Assessment/Plan   Problem List Items Addressed This Visit    None  Visit Diagnoses       Routine general medical examination at health care facility    -  Primary    Relevant Orders    1 Year Follow Up In [facility] - PCP - Wellness Exam              Reviewed Social Determinants of health with patient, discussed healthy lifestyle  including 150 minutes of physical activity per week  Ordered/Reviewed baseline labwork -CBC, CMP, Lipid Panel  Immunizations:       Depression: Not at risk (2/11/2025)    PHQ-2     PHQ-2 Score: 1     Will monitor.  Patient does like when it is warm outside we will recheck in 3 months.  Patient aware if any chest pain shortness of breath any nausea vomiting or fever headache any concerning symptoms go to ER  Will get blood work, continue his medications

## 2025-02-16 LAB
ALBUMIN SERPL-MCNC: 3.9 G/DL (ref 3.6–5.1)
ALP SERPL-CCNC: 69 U/L (ref 35–144)
ALT SERPL-CCNC: 11 U/L (ref 9–46)
ANION GAP SERPL CALCULATED.4IONS-SCNC: 9 MMOL/L (CALC) (ref 7–17)
AST SERPL-CCNC: 15 U/L (ref 10–35)
BILIRUB SERPL-MCNC: 0.5 MG/DL (ref 0.2–1.2)
BUN SERPL-MCNC: 16 MG/DL (ref 7–25)
CALCIUM SERPL-MCNC: 9.1 MG/DL (ref 8.6–10.3)
CHLORIDE SERPL-SCNC: 104 MMOL/L (ref 98–110)
CHOLEST SERPL-MCNC: 192 MG/DL
CHOLEST/HDLC SERPL: 4.4 (CALC)
CO2 SERPL-SCNC: 27 MMOL/L (ref 20–32)
CREAT SERPL-MCNC: 1.5 MG/DL (ref 0.7–1.22)
EGFRCR SERPLBLD CKD-EPI 2021: 44 ML/MIN/1.73M2
ERYTHROCYTE [DISTWIDTH] IN BLOOD BY AUTOMATED COUNT: 12.7 % (ref 11–15)
GLUCOSE SERPL-MCNC: 95 MG/DL (ref 65–99)
HCT VFR BLD AUTO: 43.2 % (ref 38.5–50)
HDLC SERPL-MCNC: 44 MG/DL
HGB BLD-MCNC: 13.9 G/DL (ref 13.2–17.1)
LDLC SERPL CALC-MCNC: 118 MG/DL (CALC)
MCH RBC QN AUTO: 30 PG (ref 27–33)
MCHC RBC AUTO-ENTMCNC: 32.2 G/DL (ref 32–36)
MCV RBC AUTO: 93.1 FL (ref 80–100)
NONHDLC SERPL-MCNC: 148 MG/DL (CALC)
PLATELET # BLD AUTO: 186 THOUSAND/UL (ref 140–400)
PMV BLD REES-ECKER: 10.5 FL (ref 7.5–12.5)
POTASSIUM SERPL-SCNC: 3.8 MMOL/L (ref 3.5–5.3)
PROT SERPL-MCNC: 7.4 G/DL (ref 6.1–8.1)
RBC # BLD AUTO: 4.64 MILLION/UL (ref 4.2–5.8)
SODIUM SERPL-SCNC: 140 MMOL/L (ref 135–146)
TRIGL SERPL-MCNC: 178 MG/DL
WBC # BLD AUTO: 5.1 THOUSAND/UL (ref 3.8–10.8)

## 2025-02-17 ENCOUNTER — TELEPHONE (OUTPATIENT)
Dept: PRIMARY CARE | Facility: CLINIC | Age: OVER 89
End: 2025-02-17
Payer: COMMERCIAL

## 2025-03-03 ENCOUNTER — OFFICE VISIT (OUTPATIENT)
Dept: ORTHOPEDIC SURGERY | Facility: CLINIC | Age: OVER 89
End: 2025-03-03
Payer: COMMERCIAL

## 2025-03-03 DIAGNOSIS — M25.521 ELBOW PAIN, CHRONIC, RIGHT: ICD-10-CM

## 2025-03-03 DIAGNOSIS — G89.29 ELBOW PAIN, CHRONIC, RIGHT: ICD-10-CM

## 2025-03-03 DIAGNOSIS — M19.029 ELBOW ARTHRITIS: Primary | ICD-10-CM

## 2025-03-03 PROCEDURE — 1157F ADVNC CARE PLAN IN RCRD: CPT | Performed by: ORTHOPAEDIC SURGERY

## 2025-03-03 PROCEDURE — 99213 OFFICE O/P EST LOW 20 MIN: CPT | Performed by: ORTHOPAEDIC SURGERY

## 2025-03-03 PROCEDURE — 99214 OFFICE O/P EST MOD 30 MIN: CPT | Performed by: ORTHOPAEDIC SURGERY

## 2025-03-03 PROCEDURE — 1159F MED LIST DOCD IN RCRD: CPT | Performed by: ORTHOPAEDIC SURGERY

## 2025-03-03 PROCEDURE — 1036F TOBACCO NON-USER: CPT | Performed by: ORTHOPAEDIC SURGERY

## 2025-03-03 PROCEDURE — 2500000004 HC RX 250 GENERAL PHARMACY W/ HCPCS (ALT 636 FOR OP/ED): Performed by: ORTHOPAEDIC SURGERY

## 2025-03-03 PROCEDURE — 20605 DRAIN/INJ JOINT/BURSA W/O US: CPT | Mod: RT | Performed by: ORTHOPAEDIC SURGERY

## 2025-03-03 RX ORDER — LIDOCAINE HYDROCHLORIDE 10 MG/ML
1.5 INJECTION, SOLUTION INFILTRATION; PERINEURAL
Status: COMPLETED | OUTPATIENT
Start: 2025-03-03 | End: 2025-03-03

## 2025-03-03 RX ORDER — TRIAMCINOLONE ACETONIDE 40 MG/ML
20 INJECTION, SUSPENSION INTRA-ARTICULAR; INTRAMUSCULAR
Status: COMPLETED | OUTPATIENT
Start: 2025-03-03 | End: 2025-03-03

## 2025-03-03 RX ADMIN — LIDOCAINE HYDROCHLORIDE 1.5 ML: 10 INJECTION, SOLUTION INFILTRATION; PERINEURAL at 15:26

## 2025-03-03 RX ADMIN — TRIAMCINOLONE ACETONIDE 20 MG: 40 INJECTION, SUSPENSION INTRA-ARTICULAR; INTRAMUSCULAR at 15:26

## 2025-03-03 NOTE — PROGRESS NOTES
History present illness: Patient presents today with his daughter who serves as  for evaluation of chronic right elbow pain.  Previous intra-articular steroid injection to right elbow delivered in December 2023 brought about excellent relief.  Pain has returned.  No injury.      Past medical history: The patient's past medical history, family history, social history, and review of systems were documented on the patient medical intake.  The updated data was reviewed in the electronic medical record.  History is negative except otherwise stated in history of present illness.        Physical examination:  General: Alert and oriented to person, place, and time.  No acute distress and breathing comfortably: Pleasant and cooperative with examination.  HEENT: Head is normocephalic and atraumatic.  Neck: Supple, no visible swelling.  Cardiovascular: No palpable tachycardia  Lungs: No audible wheezing or labored breathing  Abdomen: Nondistended.  Extremities: Evaluation of the right upper extremity finds the patient had palpable radial artery at the wrist with brisk capillary refill to all digits.  Patient has intact sensation to axillary radial median and ulnar nerves.  There are no open wounds.  There are no signs of infection.  There is no evidence of lymphedema or lymphatic streaking.  The patient has supple compartments to right arm forearm and hand.  Tenderness right elbow.      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  We talked about operative and nonoperative strategies.  Recommendations were made for repeat steroid injection and follow-up when symptoms dictate.  Patient is agreeable with this strategy.  Aseptic technique was used to inject 20 mg Kenalog along with 1.5 mL of 1% lidocaine plain intra-articular to right elbow using lateral soft spot approach.        Procedure:  M Inj/Asp: R elbow on  3/3/2025 3:26 PM  Indications: pain and joint swelling  Details: 22 G needle, posterior approach  Medications: 1.5 mL lidocaine 10 mg/mL (1 %); 20 mg triamcinolone acetonide 40 mg/mL  Outcome: tolerated well, no immediate complications  Procedure, treatment alternatives, risks and benefits explained, specific risks discussed. Consent was given by the patient and power of . Immediately prior to procedure a time out was called to verify the correct patient, procedure, equipment, support staff and site/side marked as required. Patient was prepped and draped in the usual sterile fashion.

## 2025-03-11 ENCOUNTER — TELEPHONE (OUTPATIENT)
Dept: PRIMARY CARE | Facility: CLINIC | Age: OVER 89
End: 2025-03-11
Payer: COMMERCIAL

## 2025-03-18 DIAGNOSIS — I10 HYPERTENSION, UNSPECIFIED TYPE: ICD-10-CM

## 2025-03-18 RX ORDER — LOSARTAN POTASSIUM 100 MG/1
100 TABLET ORAL DAILY
Qty: 90 TABLET | Refills: 1 | Status: SHIPPED | OUTPATIENT
Start: 2025-03-18

## 2025-04-16 ENCOUNTER — APPOINTMENT (OUTPATIENT)
Dept: ORTHOPEDIC SURGERY | Facility: CLINIC | Age: OVER 89
End: 2025-04-16
Payer: COMMERCIAL

## 2025-05-26 DIAGNOSIS — I10 HYPERTENSION, UNSPECIFIED TYPE: ICD-10-CM

## 2025-05-26 DIAGNOSIS — K21.9 GASTROESOPHAGEAL REFLUX DISEASE WITHOUT ESOPHAGITIS: ICD-10-CM

## 2025-05-26 DIAGNOSIS — E78.2 MIXED HYPERLIPIDEMIA: ICD-10-CM

## 2025-05-27 RX ORDER — SIMVASTATIN 40 MG/1
40 TABLET, FILM COATED ORAL DAILY
Qty: 100 TABLET | Refills: 0 | Status: SHIPPED | OUTPATIENT
Start: 2025-05-27

## 2025-05-27 RX ORDER — FUROSEMIDE 20 MG/1
TABLET ORAL
Qty: 30 TABLET | Refills: 0 | Status: SHIPPED | OUTPATIENT
Start: 2025-05-27

## 2025-05-27 RX ORDER — PANTOPRAZOLE SODIUM 40 MG/1
40 TABLET, DELAYED RELEASE ORAL DAILY
Qty: 90 TABLET | Refills: 0 | Status: SHIPPED | OUTPATIENT
Start: 2025-05-27

## 2025-06-27 DIAGNOSIS — I10 HYPERTENSION, UNSPECIFIED TYPE: ICD-10-CM

## 2025-06-30 RX ORDER — FUROSEMIDE 20 MG/1
TABLET ORAL
Qty: 30 TABLET | Refills: 0 | Status: SHIPPED | OUTPATIENT
Start: 2025-06-30

## 2025-08-05 DIAGNOSIS — I10 HYPERTENSION, UNSPECIFIED TYPE: ICD-10-CM

## 2025-08-07 RX ORDER — FUROSEMIDE 20 MG/1
TABLET ORAL
Qty: 30 TABLET | Refills: 0 | Status: SHIPPED | OUTPATIENT
Start: 2025-08-07

## 2025-08-12 ENCOUNTER — APPOINTMENT (OUTPATIENT)
Dept: PRIMARY CARE | Facility: CLINIC | Age: OVER 89
End: 2025-08-12
Payer: COMMERCIAL

## 2025-08-12 VITALS
BODY MASS INDEX: 28.51 KG/M2 | HEIGHT: 64 IN | SYSTOLIC BLOOD PRESSURE: 108 MMHG | WEIGHT: 167 LBS | DIASTOLIC BLOOD PRESSURE: 66 MMHG | HEART RATE: 66 BPM | RESPIRATION RATE: 16 BRPM | OXYGEN SATURATION: 97 % | TEMPERATURE: 97.7 F

## 2025-08-12 DIAGNOSIS — G89.29 ELBOW PAIN, CHRONIC, RIGHT: ICD-10-CM

## 2025-08-12 DIAGNOSIS — I48.20 CHRONIC A-FIB (MULTI): Primary | ICD-10-CM

## 2025-08-12 DIAGNOSIS — M25.521 ELBOW PAIN, CHRONIC, RIGHT: ICD-10-CM

## 2025-08-12 DIAGNOSIS — N18.31 STAGE 3A CHRONIC KIDNEY DISEASE (MULTI): ICD-10-CM

## 2025-08-12 PROCEDURE — 99214 OFFICE O/P EST MOD 30 MIN: CPT | Performed by: FAMILY MEDICINE

## 2025-08-12 PROCEDURE — 3078F DIAST BP <80 MM HG: CPT | Performed by: FAMILY MEDICINE

## 2025-08-12 PROCEDURE — G2211 COMPLEX E/M VISIT ADD ON: HCPCS | Performed by: FAMILY MEDICINE

## 2025-08-12 PROCEDURE — 1036F TOBACCO NON-USER: CPT | Performed by: FAMILY MEDICINE

## 2025-08-12 PROCEDURE — 3074F SYST BP LT 130 MM HG: CPT | Performed by: FAMILY MEDICINE

## 2025-08-12 PROCEDURE — 1159F MED LIST DOCD IN RCRD: CPT | Performed by: FAMILY MEDICINE

## 2025-08-12 ASSESSMENT — ENCOUNTER SYMPTOMS
ARTHRALGIAS: 1
WHEEZING: 0
VOMITING: 0
FEVER: 0
NECK PAIN: 1
SHORTNESS OF BREATH: 0
DIZZINESS: 0
NUMBNESS: 0
HEADACHES: 0
MYALGIAS: 1
DIARRHEA: 0
ABDOMINAL PAIN: 0
WEAKNESS: 0
COUGH: 0
CONSTIPATION: 0
JOINT SWELLING: 1

## 2026-02-17 ENCOUNTER — APPOINTMENT (OUTPATIENT)
Dept: PRIMARY CARE | Facility: CLINIC | Age: OVER 89
End: 2026-02-17
Payer: COMMERCIAL